# Patient Record
Sex: MALE | Race: WHITE | NOT HISPANIC OR LATINO | URBAN - METROPOLITAN AREA
[De-identification: names, ages, dates, MRNs, and addresses within clinical notes are randomized per-mention and may not be internally consistent; named-entity substitution may affect disease eponyms.]

---

## 2021-06-15 ENCOUNTER — APPOINTMENT (OUTPATIENT)
Dept: PREADMISSION TESTING | Facility: HOSPITAL | Age: 61
End: 2021-06-15
Payer: COMMERCIAL

## 2021-06-15 ENCOUNTER — APPOINTMENT (OUTPATIENT)
Dept: LAB | Age: 61
End: 2021-06-15
Attending: OPHTHALMOLOGY
Payer: COMMERCIAL

## 2021-06-15 ENCOUNTER — TRANSCRIBE ORDERS (OUTPATIENT)
Dept: SCHEDULING | Age: 61
End: 2021-06-15

## 2021-06-15 VITALS — HEIGHT: 69 IN | BODY MASS INDEX: 35.55 KG/M2 | WEIGHT: 240 LBS

## 2021-06-15 DIAGNOSIS — Z11.59 ENCOUNTER FOR SCREENING FOR OTHER VIRAL DISEASES: Primary | ICD-10-CM

## 2021-06-15 DIAGNOSIS — Z11.59 ENCOUNTER FOR SCREENING FOR OTHER VIRAL DISEASES: ICD-10-CM

## 2021-06-15 PROCEDURE — U0003 INFECTIOUS AGENT DETECTION BY NUCLEIC ACID (DNA OR RNA); SEVERE ACUTE RESPIRATORY SYNDROME CORONAVIRUS 2 (SARS-COV-2) (CORONAVIRUS DISEASE [COVID-19]), AMPLIFIED PROBE TECHNIQUE, MAKING USE OF HIGH THROUGHPUT TECHNOLOGIES AS DESCRIBED BY CMS-2020-01-R: HCPCS

## 2021-06-15 ASSESSMENT — PAIN SCALES - GENERAL: PAINLEVEL: 1

## 2021-06-15 NOTE — PRE-PROCEDURE INSTRUCTIONS
1. We will call you between 3 pm and 7 pm on June 16, 2021 to determine that arrival time for your procedure. If you do not hear by 6PM. Please call [phone] for arrival time.    2. Please report to Main Entrance near Parking lot A, walk into main lobby and report to the admission desk on the first floor on the day of your procedure.       3. Please follow the following fasting guidelines: Follow Dr. Feliciano's instructions on eating and drinking         Nothing to eat (no solid food) after midnight.    Unlimited clear liquids, meaning water or PLAIN black coffee WITHOUT any milk or cream, are permitted up to TWO HOURS prior to arrival at the hospital.     4. Early on the morning of the procedure please take your usual dose of the listed medications with a sip of water:Nothing to take the morning of the procedure.  No NSAIDS, Supplements, Vitamins    May take Tylenol if needed.   5. Other Instructions: You may brush your teeth the morning of the procedure. Rinse and spit, do not swallow.  Bring a list of your medications with dosages with you.  Use surgical wash as directed. Dial Antibacterial Soap the night before and the morning of the procedure.   6. If you develop a cold, cough, fever, rash, or other symptom prior to the data of the procedure, please report it to your physician immediately.   7. If you need to cancel the procedure for any reason, please contact your physician or call the unit listed above.   8. Make arrangements to have someone drive you home from the procedure. If you have not arranged for transportation home, your surgery may be cancelled.    9. You may not take public transportation unless accompanied by a responsible person.   10. You may not drive a car or operate complex or potentially dangerous machinery for 24 hours following anesthesia and/or sedation.   11. If it is medically necessary for you to have a longer stay, you will be informed as soon as the decision is made.   12. Do not  wear or bring anything of value to the hospital including jewelry of any kind, money, or wallet. Do not wear make-up or contact lenses. DO bring your glasses and a case. DO NOT BRING MEDICATIONS FROM HOME.   13. No lotion, creams, powders, or oils on skin the morning of procedure    14. Dress in comfortable clothes.   15.  If instructed, please bring a copy of your Advanced Directive (Living Will/Durable Power of ) on the day of your procedure.      Pre operative instructions given as per protocol.  Form explained by: Queenie Vivas RN     I have read and understand the above information. I have had sufficient opportunity to ask questions I might have and they have been answered to my satisfaction. I agree to comply with the Patient Responsibilities listed above and have received a copy of this form.

## 2021-06-16 ENCOUNTER — ANESTHESIA EVENT (OUTPATIENT)
Dept: SURGERY | Facility: HOSPITAL | Age: 61
Setting detail: HOSPITAL OUTPATIENT SURGERY
End: 2021-06-16
Payer: COMMERCIAL

## 2021-06-16 LAB — SARS-COV-2 RNA RESP QL NAA+PROBE: NEGATIVE

## 2021-06-16 NOTE — ANESTHESIA PREPROCEDURE EVALUATION
Relevant Problems   No relevant active problems       Anesthesia ROS/MED HX      Cardiovascular   dyslipidemia  Musculoskeletal   Arthritis     Past Medical History:   Diagnosis Date   • Arthritis     hands   • Lipid disorder        Past Surgical History:   Procedure Laterality Date   • APPENDECTOMY     • BACK SURGERY     • CHOLECYSTECTOMY         Physical Exam    Airway   Mallampati: II   TM distance: >3 FB   Neck ROM: full  Cardiovascular    Rhythm: regular   Rate: normalPulmonary    clear to auscultation  Dental - normal        Anesthesia Plan    Plan: general    Technique: general endotracheal     Lines and Monitors: PIV     Airway: video laryngoscope   ASA 2  Anesthetic plan and risks discussed with: patient  Induction:    intravenous   Comments:    Plan: 60M for R orbitotomy with mass excision

## 2021-06-17 ENCOUNTER — ANESTHESIA (OUTPATIENT)
Dept: SURGERY | Facility: HOSPITAL | Age: 61
Setting detail: HOSPITAL OUTPATIENT SURGERY
End: 2021-06-17
Payer: COMMERCIAL

## 2021-06-17 ENCOUNTER — HOSPITAL ENCOUNTER (OUTPATIENT)
Facility: HOSPITAL | Age: 61
Setting detail: HOSPITAL OUTPATIENT SURGERY
Discharge: HOME | End: 2021-06-17
Attending: OPHTHALMOLOGY | Admitting: OPHTHALMOLOGY
Payer: COMMERCIAL

## 2021-06-17 VITALS
HEIGHT: 69 IN | WEIGHT: 232 LBS | OXYGEN SATURATION: 94 % | HEART RATE: 73 BPM | DIASTOLIC BLOOD PRESSURE: 66 MMHG | SYSTOLIC BLOOD PRESSURE: 130 MMHG | RESPIRATION RATE: 20 BRPM | BODY MASS INDEX: 34.36 KG/M2 | TEMPERATURE: 97.1 F

## 2021-06-17 DIAGNOSIS — D49.89 ORBITAL TUMOR: ICD-10-CM

## 2021-06-17 PROCEDURE — 27200000 HC STERILE SUPPLY: Performed by: OPHTHALMOLOGY

## 2021-06-17 PROCEDURE — 63600000 HC DRUGS/DETAIL CODE: Performed by: NURSE ANESTHETIST, CERTIFIED REGISTERED

## 2021-06-17 PROCEDURE — 36000003 HC OR LEVEL 3 INITIAL 30MIN: Performed by: OPHTHALMOLOGY

## 2021-06-17 PROCEDURE — 63700000 HC SELF-ADMINISTRABLE DRUG: Performed by: STUDENT IN AN ORGANIZED HEALTH CARE EDUCATION/TRAINING PROGRAM

## 2021-06-17 PROCEDURE — 36000013 HC OR LEVEL 3 EA ADDL MIN: Performed by: OPHTHALMOLOGY

## 2021-06-17 PROCEDURE — 25800000 HC PHARMACY IV SOLUTIONS: Performed by: OPHTHALMOLOGY

## 2021-06-17 PROCEDURE — 25000000 HC PHARMACY GENERAL: Performed by: OPHTHALMOLOGY

## 2021-06-17 PROCEDURE — 71000011 HC PACU PHASE 1 EA ADDL MIN: Performed by: OPHTHALMOLOGY

## 2021-06-17 PROCEDURE — 71000002 HC PACU PHASE 2 INITIAL 30MIN: Performed by: OPHTHALMOLOGY

## 2021-06-17 PROCEDURE — 37000001 HC ANESTHESIA GENERAL: Performed by: OPHTHALMOLOGY

## 2021-06-17 PROCEDURE — 63600000 HC DRUGS/DETAIL CODE: Performed by: ANESTHESIOLOGY

## 2021-06-17 PROCEDURE — 25000000 HC PHARMACY GENERAL: Performed by: NURSE ANESTHETIST, CERTIFIED REGISTERED

## 2021-06-17 PROCEDURE — 71000001 HC PACU PHASE 1 INITIAL 30MIN: Performed by: OPHTHALMOLOGY

## 2021-06-17 PROCEDURE — 88305 TISSUE EXAM BY PATHOLOGIST: CPT | Performed by: OPHTHALMOLOGY

## 2021-06-17 PROCEDURE — 63700000 HC SELF-ADMINISTRABLE DRUG: Performed by: OPHTHALMOLOGY

## 2021-06-17 PROCEDURE — 0NBP0ZZ EXCISION OF RIGHT ORBIT, OPEN APPROACH: ICD-10-PCS | Performed by: OPHTHALMOLOGY

## 2021-06-17 RX ORDER — EPHEDRINE SULFATE 50 MG/ML
INJECTION, SOLUTION INTRAVENOUS AS NEEDED
Status: DISCONTINUED | OUTPATIENT
Start: 2021-06-17 | End: 2021-06-17 | Stop reason: SURG

## 2021-06-17 RX ORDER — HYDROMORPHONE HYDROCHLORIDE 1 MG/ML
0.5 INJECTION, SOLUTION INTRAMUSCULAR; INTRAVENOUS; SUBCUTANEOUS
Status: DISCONTINUED | OUTPATIENT
Start: 2021-06-17 | End: 2021-06-17 | Stop reason: HOSPADM

## 2021-06-17 RX ORDER — OXYMETAZOLINE HCL 0.05 %
SPRAY, NON-AEROSOL (ML) NASAL AS NEEDED
Status: DISCONTINUED | OUTPATIENT
Start: 2021-06-17 | End: 2021-06-17 | Stop reason: HOSPADM

## 2021-06-17 RX ORDER — IBUPROFEN 200 MG
16-32 TABLET ORAL AS NEEDED
Status: DISCONTINUED | OUTPATIENT
Start: 2021-06-17 | End: 2021-06-17 | Stop reason: HOSPADM

## 2021-06-17 RX ORDER — DEXAMETHASONE SODIUM PHOSPHATE 4 MG/ML
INJECTION, SOLUTION INTRA-ARTICULAR; INTRALESIONAL; INTRAMUSCULAR; INTRAVENOUS; SOFT TISSUE AS NEEDED
Status: DISCONTINUED | OUTPATIENT
Start: 2021-06-17 | End: 2021-06-17 | Stop reason: SURG

## 2021-06-17 RX ORDER — DEXTROSE 40 %
15-30 GEL (GRAM) ORAL AS NEEDED
Status: DISCONTINUED | OUTPATIENT
Start: 2021-06-17 | End: 2021-06-17 | Stop reason: HOSPADM

## 2021-06-17 RX ORDER — MIDAZOLAM HYDROCHLORIDE 2 MG/2ML
INJECTION, SOLUTION INTRAMUSCULAR; INTRAVENOUS AS NEEDED
Status: DISCONTINUED | OUTPATIENT
Start: 2021-06-17 | End: 2021-06-17 | Stop reason: SURG

## 2021-06-17 RX ORDER — BACITRACIN ZINC AND POLYMYXIN B SULFATE 500; 10000 [USP'U]/G; [USP'U]/G
OINTMENT OPHTHALMIC AS NEEDED
Status: DISCONTINUED | OUTPATIENT
Start: 2021-06-17 | End: 2021-06-17 | Stop reason: HOSPADM

## 2021-06-17 RX ORDER — METOCLOPRAMIDE HYDROCHLORIDE 5 MG/ML
10 INJECTION INTRAMUSCULAR; INTRAVENOUS
Status: DISCONTINUED | OUTPATIENT
Start: 2021-06-17 | End: 2021-06-17 | Stop reason: HOSPADM

## 2021-06-17 RX ORDER — ESCITALOPRAM OXALATE 10 MG/1
TABLET ORAL
COMMUNITY

## 2021-06-17 RX ORDER — DEXTROSE 50 % IN WATER (D50W) INTRAVENOUS SYRINGE
25 AS NEEDED
Status: DISCONTINUED | OUTPATIENT
Start: 2021-06-17 | End: 2021-06-17 | Stop reason: HOSPADM

## 2021-06-17 RX ORDER — NAPROXEN SODIUM 220 MG/1
TABLET, FILM COATED ORAL
COMMUNITY

## 2021-06-17 RX ORDER — LIDOCAINE HYDROCHLORIDE 10 MG/ML
INJECTION, SOLUTION INFILTRATION; PERINEURAL AS NEEDED
Status: DISCONTINUED | OUTPATIENT
Start: 2021-06-17 | End: 2021-06-17 | Stop reason: SURG

## 2021-06-17 RX ORDER — ONDANSETRON HYDROCHLORIDE 2 MG/ML
4 INJECTION, SOLUTION INTRAVENOUS
Status: DISCONTINUED | OUTPATIENT
Start: 2021-06-17 | End: 2021-06-17 | Stop reason: HOSPADM

## 2021-06-17 RX ORDER — ONDANSETRON HYDROCHLORIDE 2 MG/ML
INJECTION, SOLUTION INTRAVENOUS AS NEEDED
Status: DISCONTINUED | OUTPATIENT
Start: 2021-06-17 | End: 2021-06-17 | Stop reason: SURG

## 2021-06-17 RX ORDER — LIDOCAINE HCL/EPINEPHRINE/PF 2%-1:200K
VIAL (ML) INJECTION AS NEEDED
Status: DISCONTINUED | OUTPATIENT
Start: 2021-06-17 | End: 2021-06-17 | Stop reason: HOSPADM

## 2021-06-17 RX ORDER — HYDROCODONE BITARTRATE AND ACETAMINOPHEN 5; 325 MG/1; MG/1
1 TABLET ORAL ONCE AS NEEDED
Status: COMPLETED | OUTPATIENT
Start: 2021-06-17 | End: 2021-06-17

## 2021-06-17 RX ORDER — SODIUM CHLORIDE 9 MG/ML
INJECTION, SOLUTION INTRAVENOUS CONTINUOUS
Status: DISCONTINUED | OUTPATIENT
Start: 2021-06-17 | End: 2021-06-17 | Stop reason: HOSPADM

## 2021-06-17 RX ORDER — PROPOFOL 10 MG/ML
INJECTION, EMULSION INTRAVENOUS AS NEEDED
Status: DISCONTINUED | OUTPATIENT
Start: 2021-06-17 | End: 2021-06-17 | Stop reason: SURG

## 2021-06-17 RX ORDER — FENTANYL CITRATE 50 UG/ML
INJECTION, SOLUTION INTRAMUSCULAR; INTRAVENOUS AS NEEDED
Status: DISCONTINUED | OUTPATIENT
Start: 2021-06-17 | End: 2021-06-17 | Stop reason: SURG

## 2021-06-17 RX ORDER — FENTANYL CITRATE 50 UG/ML
50 INJECTION, SOLUTION INTRAMUSCULAR; INTRAVENOUS
Status: DISCONTINUED | OUTPATIENT
Start: 2021-06-17 | End: 2021-06-17 | Stop reason: HOSPADM

## 2021-06-17 RX ADMIN — MIDAZOLAM HYDROCHLORIDE 2 MG: 1 INJECTION, SOLUTION INTRAMUSCULAR; INTRAVENOUS at 14:34

## 2021-06-17 RX ADMIN — EPHEDRINE SULFATE 10 MG: 50 INJECTION, SOLUTION INTRAVENOUS at 15:03

## 2021-06-17 RX ADMIN — FENTANYL CITRATE 25 MCG: 50 INJECTION, SOLUTION INTRAMUSCULAR; INTRAVENOUS at 15:20

## 2021-06-17 RX ADMIN — FENTANYL CITRATE 25 MCG: 50 INJECTION, SOLUTION INTRAMUSCULAR; INTRAVENOUS at 15:30

## 2021-06-17 RX ADMIN — CEFAZOLIN 2 G: 330 INJECTION, POWDER, FOR SOLUTION INTRAMUSCULAR; INTRAVENOUS at 14:30

## 2021-06-17 RX ADMIN — FENTANYL CITRATE 50 MCG: 50 INJECTION, SOLUTION INTRAMUSCULAR; INTRAVENOUS at 17:01

## 2021-06-17 RX ADMIN — SODIUM CHLORIDE: 9 INJECTION, SOLUTION INTRAVENOUS at 12:42

## 2021-06-17 RX ADMIN — EPHEDRINE SULFATE 10 MG: 50 INJECTION, SOLUTION INTRAVENOUS at 14:56

## 2021-06-17 RX ADMIN — HYDROCODONE BITARTRATE AND ACETAMINOPHEN 1 TABLET: 5; 325 TABLET ORAL at 17:37

## 2021-06-17 RX ADMIN — FENTANYL CITRATE 50 MCG: 50 INJECTION, SOLUTION INTRAMUSCULAR; INTRAVENOUS at 16:05

## 2021-06-17 RX ADMIN — FENTANYL CITRATE 50 MCG: 50 INJECTION, SOLUTION INTRAMUSCULAR; INTRAVENOUS at 17:18

## 2021-06-17 RX ADMIN — DEXAMETHASONE SODIUM PHOSPHATE 10 MG: 4 INJECTION, SOLUTION INTRAMUSCULAR; INTRAVENOUS at 14:43

## 2021-06-17 RX ADMIN — FENTANYL CITRATE 25 MCG: 50 INJECTION, SOLUTION INTRAMUSCULAR; INTRAVENOUS at 14:52

## 2021-06-17 RX ADMIN — FENTANYL CITRATE 25 MCG: 50 INJECTION, SOLUTION INTRAMUSCULAR; INTRAVENOUS at 14:50

## 2021-06-17 RX ADMIN — EPHEDRINE SULFATE 10 MG: 50 INJECTION, SOLUTION INTRAVENOUS at 14:51

## 2021-06-17 RX ADMIN — ONDANSETRON HYDROCHLORIDE 4 MG: 2 SOLUTION INTRAMUSCULAR; INTRAVENOUS at 14:47

## 2021-06-17 RX ADMIN — LIDOCAINE HYDROCHLORIDE 5 ML: 10 INJECTION, SOLUTION INFILTRATION; PERINEURAL at 14:42

## 2021-06-17 RX ADMIN — PROPOFOL INJECTABLE EMULSION 200 MG: 10 INJECTION, EMULSION INTRAVENOUS at 14:42

## 2021-06-17 RX ADMIN — EPHEDRINE SULFATE 10 MG: 50 INJECTION, SOLUTION INTRAVENOUS at 15:06

## 2021-06-17 ASSESSMENT — PAIN DESCRIPTION - DESCRIPTORS: DESCRIPTORS: STABBING

## 2021-06-17 ASSESSMENT — PAIN - FUNCTIONAL ASSESSMENT: PAIN_FUNCTIONAL_ASSESSMENT: 0-10

## 2021-06-17 NOTE — H&P
Patient H&P reviewed from PCP there are no relevant changes to the patient's health.  He has a right orbital tumor for planned excision.    Earlene Feliciano MD

## 2021-06-17 NOTE — DISCHARGE INSTRUCTIONS
WILLIAM ANTUNEZ M.D.                                   SPECIALIZING IN OCULOPLASTICS                             EYELID, LACRIMAL AND ORBITAL SURGERY     POST-OPERATIVE INSTRUCTIONS FOR EYELID SURGERY:    1. Avoid strenous exercise for one week.  No heavy lifting over 10 pounds.  2. Apply ice compresses to operated eye(s) at least four times a day for 3 days, then start warm compresses four times a day.   3.  If the surgeon has placed a bandage over the surgical site, please keep in place and keep dry until instructed to remove in 2 days. If the surgeon has not placed a bandage over your surgical site, you may get your incisions wet.  You may shower but keep your back to the spray.  4. If severe bleeding, pain or loss of vision occurs call Dr. Feliciano immediately at [phone].    MEDICATIONS:    1. Apply Erythromycin ointment twice a day to suture line(s) and into the eye(s) at bedtime.   2. Use eyewash in the morning to remove the residual ointment if needed.   3. Take pain medication as instructed.   4. Use artificial tears four times a day as needed for dry eyes.   5. No  aspirin for 3 days post operatively.   6. If prescribed a medrol dose pack, use as directed. You may start tomorrow.    ADDITIONAL INSTRUCTIONS:    1.  Keep head in position higher than your heart, especially while sleeping.  2.  Due to the effects of anesthesia, you may not drive a vehicle or drink alcohol for the next 24 hours.  Plan to rest for the remainder of the day.  Your coordination and judgment will not be at their best to keep this in mind when making important decisions or caring for young children for the initial 24 hours after surgery.  3.  You may experience temporary blurring of vision, swelling, and/or bruising.  Eyelid should not swell completely shut.  If both eyes were done, these things may be more pronounced on one side.  Symmetry will be assessed at your postop exams.  4. A  follow-up appointment in 1-2 week(s) can be made at the time of surgery or can be scheduled by calling the office at [phone] with Dr. Feliciano .     FOR ANY QUESTIONS OR CONCERNS, PLEASE CALL:                       [phone]                      OR                    [phone]      [facility]                                                                                                      ARNEL    [address] [place] [address]  [id number]                                                                         [address]  [place] [address]                                                DAKOTA BENITEZ [id number]

## 2021-06-17 NOTE — OP NOTE
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  He was informed of her diagnosis, proposed treatment, feasible alternatives, prognosis with and without surgery, and the likelihood of surgical success.  The planned surgical procedure was discussed in detail with the patient in terms that could be understood including the expected results and possible complications.  The patient expressed understanding of the specific risks of surgery including, but not limited to postoperative infection, hemorrhage, poor wound healing, scarring, pain, asymmetry, loss of vision (blindness), and failure to achieve intended goals of surgery.  The possibility of further surgery being required was discussed in detail with the patient in terms that could be understood.  The patient expressed understanding and desired to proceed.        DESCRIPTION OF PROCEDURE:  The patient was brought back into the operating suite and identified by the operating surgeon, Earlene Feliciano M.D.  The  patient was placed supine on the operating table and connected to appropriate monitors.  A time-out was taken prior to starting the procedure to perform final review of the correct patient, procedure to be performed, and the operative site.  The patient was then induced under general anesthesia by the Anesthesia Team, and the right upper eyelid were marked in the area of the eyelid crease for intended lid crease approach for the orbitotomy.  The upper eyelid was then infiltrated with the aforementioned local anesthetic, and the area was prepped and draped in the usual sterile manner for surgery.  Attention was turned to the right side, where the lid markings were incised using a #15 Bard-Ashwin blade ,and the skin was excised using monopolar cautery.  The inferior aspect of the incision was distracted inferiorly using a four prong rake. And dissection was carried down to the pre-aponeurotic fat.  The orbital lobe of the lacrimal gland was identified and carefully avoided.  The levator muscle was similarly identified and carefully avoided. The dissection was then carried posteriorly above the pre-aponeurotic fat into the orbit and malleable retractors were used in a hand over hand technique and the mass was identified.  A 4-0 silk suture was passed through its anterior portion and it was pulled anteriorly with the traction suture, a combination of David's scissors and cotton tip applicators were used to bluntly separate the mass from the orbital contents.  The mass was delivered and passed off for histopathologic assessment.  The orbit was packed with an Afrin soaked cottonoid and gentle pressure applied.  The cottonoid was removed and the orbit was checked for hemostasis and noted to be clean and dry.  The lid incision was then closed with 6-0 plain gut in a running and interrupted fashion.  Meticulous hemostasis was achieved with monopolar cautery.  The patient's care was turned over to the anesthesia team and he  was brought to the recovery room in stable condition after being awoken from general anesthesia.  There were no complications in this case. He was checked in the post-operative area vision was at least 20/50 in the right eye, the pupil was round and brisk without afferent defect, orbit soft and motility full.     Earlene Feliciano MD  Oculoplastic and orbital Surgery           Condition: Stable

## 2021-06-17 NOTE — ANESTHESIA POSTPROCEDURE EVALUATION
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])    Surgeons: Earlene Feliciano MD Responsible Provider: Lev Tang DO    Anesthesia Type: general ASA Status: 2          Anesthesia Type: general  PACU Vitals  6/17/2021 1545 - 6/17/2021 1645      6/17/2021  1549 6/17/2021  1600 6/17/2021  1630 6/17/2021  1645    BP:  136/73  (!) 150/73  140/67  (!) 144/72    Temp:  --  36.7 °C (98 °F)  --  --    Pulse:  89  82  74  67    Resp:  (!) 25  20  18  20    SpO2:  97 %  98 %  97 %  97 %            Anesthesia Post Evaluation    Pain management: adequate  Patient participation: complete - patient participated  Level of consciousness: awake and alert  Cardiovascular status: acceptable  Airway Patency: adequate  Respiratory status: acceptable  Hydration status: acceptable  Anesthetic complications: no

## 2021-06-17 NOTE — OR SURGEON
Pre-Procedure patient identification:  I am the primary operating surgeon/proceduralist and I have identified the patient on 06/17/21 at 12:12 PM Earlene Feliciano MD  Phone Number: [phone]

## 2021-06-17 NOTE — BRIEF OP NOTE
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

## 2021-06-17 NOTE — ANESTHESIOLOGIST PRE-PROCEDURE ATTESTATION
Pre-Procedure Patient Identification:  I am the Primary Anesthesiologist and have identified the patient on 06/17/21 at 1:08 PM.   I have confirmed the following procedure(s) Orbiotomy With Excision Of Mass Right (R) will be performed by the following surgeon/proceduralist Earlene Feliciano MD.

## 2021-06-21 LAB
CASE RPRT: NORMAL
CLINICAL INFO: NORMAL
PATH REPORT.FINAL DX SPEC: NORMAL
PATH REPORT.GROSS SPEC: NORMAL

## 2024-04-04 ENCOUNTER — HOSPITAL ENCOUNTER (INPATIENT)
Facility: HOSPITAL | Age: 64
LOS: 6 days | Discharge: HOME | DRG: 871 | End: 2024-04-10
Attending: EMERGENCY MEDICINE | Admitting: STUDENT IN AN ORGANIZED HEALTH CARE EDUCATION/TRAINING PROGRAM
Payer: COMMERCIAL

## 2024-04-04 ENCOUNTER — APPOINTMENT (EMERGENCY)
Dept: RADIOLOGY | Facility: HOSPITAL | Age: 64
DRG: 871 | End: 2024-04-04
Payer: COMMERCIAL

## 2024-04-04 DIAGNOSIS — R07.89 CHEST WALL PAIN: ICD-10-CM

## 2024-04-04 DIAGNOSIS — R07.9 CHEST PAIN: ICD-10-CM

## 2024-04-04 DIAGNOSIS — R09.02 HYPOXIA: ICD-10-CM

## 2024-04-04 DIAGNOSIS — J18.9 PNEUMONIA: Primary | ICD-10-CM

## 2024-04-04 DIAGNOSIS — R50.9 FEVER: ICD-10-CM

## 2024-04-04 DIAGNOSIS — R41.82 ALTERED MENTAL STATUS: ICD-10-CM

## 2024-04-04 DIAGNOSIS — G89.29 OTHER CHRONIC PAIN: ICD-10-CM

## 2024-04-04 DIAGNOSIS — F41.9 ANXIOUS MOOD: ICD-10-CM

## 2024-04-04 DIAGNOSIS — F19.10 POLYSUBSTANCE ABUSE (HCC): ICD-10-CM

## 2024-04-04 DIAGNOSIS — R06.02 SOB (SHORTNESS OF BREATH): ICD-10-CM

## 2024-04-04 PROBLEM — R56.9 SEIZURES (HCC): Status: ACTIVE | Noted: 2024-04-04

## 2024-04-04 PROBLEM — A41.9 SEPSIS (HCC): Status: ACTIVE | Noted: 2024-04-04

## 2024-04-04 PROBLEM — E78.5 HYPERLIPIDEMIA: Status: ACTIVE | Noted: 2024-04-04

## 2024-04-04 LAB
2HR DELTA HS TROPONIN: 32 NG/L
4HR DELTA HS TROPONIN: 38 NG/L
ALBUMIN SERPL BCP-MCNC: 3.9 G/DL (ref 3.5–5)
ALP SERPL-CCNC: 95 U/L (ref 34–104)
ALT SERPL W P-5'-P-CCNC: 39 U/L (ref 7–52)
AMPHETAMINES SERPL QL SCN: NEGATIVE
ANION GAP SERPL CALCULATED.3IONS-SCNC: 7 MMOL/L (ref 4–13)
APTT PPP: 34 SECONDS (ref 23–37)
AST SERPL W P-5'-P-CCNC: 42 U/L (ref 13–39)
ATRIAL RATE: 107 BPM
BACTERIA UR QL AUTO: ABNORMAL /HPF
BARBITURATES UR QL: NEGATIVE
BASOPHILS # BLD AUTO: 0.01 THOUSANDS/ÂΜL (ref 0–0.1)
BASOPHILS NFR BLD AUTO: 0 % (ref 0–1)
BENZODIAZ UR QL: POSITIVE
BILIRUB SERPL-MCNC: 1.1 MG/DL (ref 0.2–1)
BILIRUB UR QL STRIP: NEGATIVE
BUN SERPL-MCNC: 26 MG/DL (ref 5–25)
CALCIUM SERPL-MCNC: 8.5 MG/DL (ref 8.4–10.2)
CARDIAC TROPONIN I PNL SERPL HS: 14 NG/L
CARDIAC TROPONIN I PNL SERPL HS: 46 NG/L
CARDIAC TROPONIN I PNL SERPL HS: 52 NG/L
CHLORIDE SERPL-SCNC: 107 MMOL/L (ref 96–108)
CLARITY UR: CLEAR
CO2 SERPL-SCNC: 28 MMOL/L (ref 21–32)
COCAINE UR QL: NEGATIVE
COLOR UR: YELLOW
CREAT SERPL-MCNC: 0.85 MG/DL (ref 0.6–1.3)
EOSINOPHIL # BLD AUTO: 0.28 THOUSAND/ÂΜL (ref 0–0.61)
EOSINOPHIL NFR BLD AUTO: 8 % (ref 0–6)
ERYTHROCYTE [DISTWIDTH] IN BLOOD BY AUTOMATED COUNT: 12.7 % (ref 11.6–15.1)
FENTANYL UR QL SCN: NEGATIVE
FLUAV RNA RESP QL NAA+PROBE: NEGATIVE
FLUBV RNA RESP QL NAA+PROBE: NEGATIVE
GFR SERPL CREATININE-BSD FRML MDRD: 92 ML/MIN/1.73SQ M
GLUCOSE SERPL-MCNC: 110 MG/DL (ref 65–140)
GLUCOSE UR STRIP-MCNC: NEGATIVE MG/DL
HCT VFR BLD AUTO: 39.7 % (ref 36.5–49.3)
HGB BLD-MCNC: 13.5 G/DL (ref 12–17)
HGB UR QL STRIP.AUTO: NEGATIVE
HYALINE CASTS #/AREA URNS LPF: ABNORMAL /LPF
HYDROCODONE UR QL SCN: NEGATIVE
IMM GRANULOCYTES # BLD AUTO: 0 THOUSAND/UL (ref 0–0.2)
IMM GRANULOCYTES NFR BLD AUTO: 0 % (ref 0–2)
INR PPP: 0.96 (ref 0.84–1.19)
KETONES UR STRIP-MCNC: NEGATIVE MG/DL
L PNEUMO1 AG UR QL IA.RAPID: NEGATIVE
LACTATE SERPL-SCNC: 1.2 MMOL/L (ref 0.5–2)
LEUKOCYTE ESTERASE UR QL STRIP: NEGATIVE
LIPASE SERPL-CCNC: 14 U/L (ref 11–82)
LYMPHOCYTES # BLD AUTO: 1.13 THOUSANDS/ÂΜL (ref 0.6–4.47)
LYMPHOCYTES NFR BLD AUTO: 33 % (ref 14–44)
MCH RBC QN AUTO: 31.5 PG (ref 26.8–34.3)
MCHC RBC AUTO-ENTMCNC: 34 G/DL (ref 31.4–37.4)
MCV RBC AUTO: 93 FL (ref 82–98)
METHADONE UR QL: NEGATIVE
MONOCYTES # BLD AUTO: 0.39 THOUSAND/ÂΜL (ref 0.17–1.22)
MONOCYTES NFR BLD AUTO: 11 % (ref 4–12)
MUCOUS THREADS UR QL AUTO: ABNORMAL
NEUTROPHILS # BLD AUTO: 1.65 THOUSANDS/ÂΜL (ref 1.85–7.62)
NEUTS SEG NFR BLD AUTO: 48 % (ref 43–75)
NITRITE UR QL STRIP: NEGATIVE
NON-SQ EPI CELLS URNS QL MICRO: ABNORMAL /HPF
NRBC BLD AUTO-RTO: 0 /100 WBCS
OPIATES UR QL SCN: NEGATIVE
OXYCODONE+OXYMORPHONE UR QL SCN: NEGATIVE
P AXIS: 41 DEGREES
PCP UR QL: NEGATIVE
PH UR STRIP.AUTO: 5.5 [PH]
PLATELET # BLD AUTO: 142 THOUSANDS/UL (ref 149–390)
PMV BLD AUTO: 9.6 FL (ref 8.9–12.7)
POTASSIUM SERPL-SCNC: 4 MMOL/L (ref 3.5–5.3)
PR INTERVAL: 182 MS
PROCALCITONIN SERPL-MCNC: 0.14 NG/ML
PROT SERPL-MCNC: 6.4 G/DL (ref 6.4–8.4)
PROT UR STRIP-MCNC: ABNORMAL MG/DL
PROTHROMBIN TIME: 13.4 SECONDS (ref 11.6–14.5)
QRS AXIS: -16 DEGREES
QRSD INTERVAL: 94 MS
QT INTERVAL: 334 MS
QTC INTERVAL: 445 MS
RBC # BLD AUTO: 4.28 MILLION/UL (ref 3.88–5.62)
RBC #/AREA URNS AUTO: ABNORMAL /HPF
RSV RNA RESP QL NAA+PROBE: NEGATIVE
S PNEUM AG UR QL: NEGATIVE
SARS-COV-2 RNA RESP QL NAA+PROBE: NEGATIVE
SODIUM SERPL-SCNC: 142 MMOL/L (ref 135–147)
SP GR UR STRIP.AUTO: 1.04 (ref 1–1.03)
T WAVE AXIS: 38 DEGREES
THC UR QL: NEGATIVE
UROBILINOGEN UR STRIP-ACNC: 3 MG/DL
VENTRICULAR RATE: 107 BPM
WBC # BLD AUTO: 3.46 THOUSAND/UL (ref 4.31–10.16)
WBC #/AREA URNS AUTO: ABNORMAL /HPF

## 2024-04-04 PROCEDURE — 80307 DRUG TEST PRSMV CHEM ANLYZR: CPT | Performed by: STUDENT IN AN ORGANIZED HEALTH CARE EDUCATION/TRAINING PROGRAM

## 2024-04-04 PROCEDURE — 96365 THER/PROPH/DIAG IV INF INIT: CPT

## 2024-04-04 PROCEDURE — 83605 ASSAY OF LACTIC ACID: CPT | Performed by: EMERGENCY MEDICINE

## 2024-04-04 PROCEDURE — 80053 COMPREHEN METABOLIC PANEL: CPT | Performed by: EMERGENCY MEDICINE

## 2024-04-04 PROCEDURE — 83690 ASSAY OF LIPASE: CPT | Performed by: EMERGENCY MEDICINE

## 2024-04-04 PROCEDURE — 99285 EMERGENCY DEPT VISIT HI MDM: CPT

## 2024-04-04 PROCEDURE — 85025 COMPLETE CBC W/AUTO DIFF WBC: CPT | Performed by: EMERGENCY MEDICINE

## 2024-04-04 PROCEDURE — 0241U HB NFCT DS VIR RESP RNA 4 TRGT: CPT | Performed by: EMERGENCY MEDICINE

## 2024-04-04 PROCEDURE — 36415 COLL VENOUS BLD VENIPUNCTURE: CPT | Performed by: EMERGENCY MEDICINE

## 2024-04-04 PROCEDURE — 71045 X-RAY EXAM CHEST 1 VIEW: CPT

## 2024-04-04 PROCEDURE — 84484 ASSAY OF TROPONIN QUANT: CPT | Performed by: EMERGENCY MEDICINE

## 2024-04-04 PROCEDURE — 85730 THROMBOPLASTIN TIME PARTIAL: CPT | Performed by: EMERGENCY MEDICINE

## 2024-04-04 PROCEDURE — 96374 THER/PROPH/DIAG INJ IV PUSH: CPT

## 2024-04-04 PROCEDURE — 94760 N-INVAS EAR/PLS OXIMETRY 1: CPT

## 2024-04-04 PROCEDURE — 85610 PROTHROMBIN TIME: CPT | Performed by: EMERGENCY MEDICINE

## 2024-04-04 PROCEDURE — 84145 PROCALCITONIN (PCT): CPT | Performed by: EMERGENCY MEDICINE

## 2024-04-04 PROCEDURE — 94660 CPAP INITIATION&MGMT: CPT

## 2024-04-04 PROCEDURE — 87449 NOS EACH ORGANISM AG IA: CPT | Performed by: STUDENT IN AN ORGANIZED HEALTH CARE EDUCATION/TRAINING PROGRAM

## 2024-04-04 PROCEDURE — 96375 TX/PRO/DX INJ NEW DRUG ADDON: CPT

## 2024-04-04 PROCEDURE — 93010 ELECTROCARDIOGRAM REPORT: CPT | Performed by: INTERNAL MEDICINE

## 2024-04-04 PROCEDURE — 81001 URINALYSIS AUTO W/SCOPE: CPT | Performed by: EMERGENCY MEDICINE

## 2024-04-04 PROCEDURE — 99285 EMERGENCY DEPT VISIT HI MDM: CPT | Performed by: EMERGENCY MEDICINE

## 2024-04-04 PROCEDURE — 93005 ELECTROCARDIOGRAM TRACING: CPT

## 2024-04-04 PROCEDURE — 87040 BLOOD CULTURE FOR BACTERIA: CPT | Performed by: EMERGENCY MEDICINE

## 2024-04-04 PROCEDURE — 99223 1ST HOSP IP/OBS HIGH 75: CPT | Performed by: STUDENT IN AN ORGANIZED HEALTH CARE EDUCATION/TRAINING PROGRAM

## 2024-04-04 RX ORDER — POLYETHYLENE GLYCOL 3350 17 G/17G
17 POWDER, FOR SOLUTION ORAL DAILY
Status: DISCONTINUED | OUTPATIENT
Start: 2024-04-04 | End: 2024-04-10 | Stop reason: HOSPADM

## 2024-04-04 RX ORDER — SODIUM CHLORIDE, SODIUM LACTATE, POTASSIUM CHLORIDE, CALCIUM CHLORIDE 600; 310; 30; 20 MG/100ML; MG/100ML; MG/100ML; MG/100ML
150 INJECTION, SOLUTION INTRAVENOUS CONTINUOUS
Status: DISCONTINUED | OUTPATIENT
Start: 2024-04-04 | End: 2024-04-05

## 2024-04-04 RX ORDER — BUPRENORPHINE 2 MG/1
4 TABLET SUBLINGUAL 4 TIMES DAILY
Status: DISCONTINUED | OUTPATIENT
Start: 2024-04-04 | End: 2024-04-10 | Stop reason: HOSPADM

## 2024-04-04 RX ORDER — CHLORDIAZEPOXIDE HYDROCHLORIDE 10 MG/1
10 CAPSULE, GELATIN COATED ORAL EVERY 8 HOURS SCHEDULED
Status: DISCONTINUED | OUTPATIENT
Start: 2024-04-04 | End: 2024-04-08

## 2024-04-04 RX ORDER — KETOROLAC TROMETHAMINE 30 MG/ML
15 INJECTION, SOLUTION INTRAMUSCULAR; INTRAVENOUS EVERY 6 HOURS PRN
Status: DISCONTINUED | OUTPATIENT
Start: 2024-04-04 | End: 2024-04-06

## 2024-04-04 RX ORDER — BUPRENORPHINE 2 MG/1
4 TABLET SUBLINGUAL 4 TIMES DAILY
COMMUNITY

## 2024-04-04 RX ORDER — ACETAMINOPHEN 325 MG/1
975 TABLET ORAL ONCE
Status: COMPLETED | OUTPATIENT
Start: 2024-04-04 | End: 2024-04-04

## 2024-04-04 RX ORDER — ACETAMINOPHEN 325 MG/1
650 TABLET ORAL EVERY 6 HOURS PRN
Status: DISCONTINUED | OUTPATIENT
Start: 2024-04-04 | End: 2024-04-05

## 2024-04-04 RX ORDER — DULOXETIN HYDROCHLORIDE 60 MG/1
60 CAPSULE, DELAYED RELEASE ORAL DAILY
COMMUNITY

## 2024-04-04 RX ORDER — PREGABALIN 25 MG/1
100 CAPSULE ORAL 2 TIMES DAILY
COMMUNITY

## 2024-04-04 RX ORDER — LEVETIRACETAM 500 MG/1
500 TABLET ORAL EVERY 12 HOURS SCHEDULED
COMMUNITY

## 2024-04-04 RX ORDER — ENOXAPARIN SODIUM 100 MG/ML
40 INJECTION SUBCUTANEOUS DAILY
Status: DISCONTINUED | OUTPATIENT
Start: 2024-04-04 | End: 2024-04-10 | Stop reason: HOSPADM

## 2024-04-04 RX ORDER — PREGABALIN 100 MG/1
100 CAPSULE ORAL 2 TIMES DAILY
Status: DISCONTINUED | OUTPATIENT
Start: 2024-04-04 | End: 2024-04-10 | Stop reason: HOSPADM

## 2024-04-04 RX ORDER — LEVETIRACETAM 500 MG/1
500 TABLET ORAL EVERY 12 HOURS SCHEDULED
Status: DISCONTINUED | OUTPATIENT
Start: 2024-04-04 | End: 2024-04-10 | Stop reason: HOSPADM

## 2024-04-04 RX ORDER — DULOXETIN HYDROCHLORIDE 60 MG/1
60 CAPSULE, DELAYED RELEASE ORAL DAILY
Status: DISCONTINUED | OUTPATIENT
Start: 2024-04-05 | End: 2024-04-07

## 2024-04-04 RX ORDER — KETOROLAC TROMETHAMINE 30 MG/ML
15 INJECTION, SOLUTION INTRAMUSCULAR; INTRAVENOUS ONCE
Status: COMPLETED | OUTPATIENT
Start: 2024-04-04 | End: 2024-04-04

## 2024-04-04 RX ADMIN — ACETAMINOPHEN 650 MG: 325 TABLET, FILM COATED ORAL at 12:17

## 2024-04-04 RX ADMIN — PREGABALIN 100 MG: 100 CAPSULE ORAL at 22:02

## 2024-04-04 RX ADMIN — KETOROLAC TROMETHAMINE 15 MG: 30 INJECTION, SOLUTION INTRAMUSCULAR; INTRAVENOUS at 08:07

## 2024-04-04 RX ADMIN — LEVETIRACETAM 500 MG: 500 TABLET, FILM COATED ORAL at 12:17

## 2024-04-04 RX ADMIN — SODIUM CHLORIDE, SODIUM LACTATE, POTASSIUM CHLORIDE, AND CALCIUM CHLORIDE 150 ML/HR: .6; .31; .03; .02 INJECTION, SOLUTION INTRAVENOUS at 10:08

## 2024-04-04 RX ADMIN — KETOROLAC TROMETHAMINE 15 MG: 30 INJECTION, SOLUTION INTRAMUSCULAR; INTRAVENOUS at 10:07

## 2024-04-04 RX ADMIN — BUPRENORPHINE 4 MG: 2 TABLET SUBLINGUAL at 19:38

## 2024-04-04 RX ADMIN — ENOXAPARIN SODIUM 40 MG: 40 INJECTION SUBCUTANEOUS at 22:02

## 2024-04-04 RX ADMIN — POLYETHYLENE GLYCOL 3350 17 G: 17 POWDER, FOR SOLUTION ORAL at 10:08

## 2024-04-04 RX ADMIN — SODIUM CHLORIDE 1000 ML: 0.9 INJECTION, SOLUTION INTRAVENOUS at 08:11

## 2024-04-04 RX ADMIN — CHLORDIAZEPOXIDE HYDROCHLORIDE 10 MG: 10 CAPSULE ORAL at 09:18

## 2024-04-04 RX ADMIN — KETOROLAC TROMETHAMINE 15 MG: 30 INJECTION, SOLUTION INTRAMUSCULAR; INTRAVENOUS at 22:02

## 2024-04-04 RX ADMIN — AZITHROMYCIN MONOHYDRATE 500 MG: 500 INJECTION, POWDER, LYOPHILIZED, FOR SOLUTION INTRAVENOUS at 08:14

## 2024-04-04 RX ADMIN — ACETAMINOPHEN 975 MG: 325 TABLET, FILM COATED ORAL at 08:03

## 2024-04-04 RX ADMIN — CEFTRIAXONE SODIUM 1000 MG: 10 INJECTION, POWDER, FOR SOLUTION INTRAVENOUS at 08:19

## 2024-04-04 RX ADMIN — LEVETIRACETAM 500 MG: 500 TABLET, FILM COATED ORAL at 22:02

## 2024-04-04 RX ADMIN — BUPRENORPHINE 4 MG: 2 TABLET SUBLINGUAL at 09:18

## 2024-04-04 NOTE — ED NOTES
Provided pt with warm compress to help alleviate pain. Plan of care ongoing.     Kesha Harvey RN  04/04/24 6661

## 2024-04-04 NOTE — SEPSIS NOTE
Sepsis Note   Lalito Cramer 63 y.o. male MRN: [id number]  Unit/Bed#: ED 15 Encounter: [id number]       Initial Sepsis Screening       Row Name 04/04/24 0921                Is the patient's history suggestive of a new or worsening infection? Yes (Proceed)  -CE        Suspected source of infection pneumonia  -CE        Indicate SIRS criteria Hyperthemia > 38.3C (100.9F) OR Hypothermia <36C (96.8F);Tachycardia > 90 bpm;Leukocytosis (WBC > 49089 IJL) OR Leukopenia (WBC <4000 IJL) OR Bandemia (WBC >10% bands)  -CE        Are two or more of the above signs & symptoms of infection both present and new to the patient? Yes (Proceed)  -CE        Assess for evidence of organ dysfunction: Are any of the below criteria present within 6 hours of suspected infection and SIRS criteria that are NOT considered to be chronic conditions? --                  User Key  (r) = Recorded By, (t) = Taken By, (c) = Cosigned By      Initials Name Provider Type    CE Korey Cam MD Physician                        There is no height or weight on file to calculate BMI.  Wt Readings from Last 1 Encounters:   04/04/24 90.7 kg (200 lb)        Height is required to calculate ideal body weight.
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
## 2024-04-04 NOTE — ASSESSMENT & PLAN NOTE
Continue Lyrica and duloxetine  Added Toradol for moderate to severe pain  Avoid narcotics given history of abuse and assessment necessary.

## 2024-04-04 NOTE — ASSESSMENT & PLAN NOTE
Presenting from drug/alcohol rehab facility with SOB and hypoxia requiring 4L NC   He reported this was ongoing for the last several days but worsened today and he developed some confusion  Confusion started today   Covid/rvp negative   Started on azithro/ctx in the ED   Sending urinary antigens   Procal next AM labs   Low threshold to get CT chest if not improving

## 2024-04-04 NOTE — ASSESSMENT & PLAN NOTE
Presenting with concerns of PNA with sob and hypoxia  , febrile to 101.9F, hypoxic to low 80s on room air now requiring 4L NC, WBC 3.4  Received 1 liter NS in the ED   Started on azithro/ctx for possible PNA   Sepsis power plan  Follow up blood and urine cultures

## 2024-04-04 NOTE — UTILIZATION REVIEW
NOTIFICATION OF INPATIENT ADMISSION   AUTHORIZATION REQUEST   SERVICING FACILITY:   [place] [address]  Tax ID: [id number]  NPI: [id number] ATTENDING PROVIDER:  Attending Name and NPI#: Blake Neri Md [[id number]]  Address: [address] [place] [address]  Phone: [phone]     ADMISSION INFORMATION:  Place of Service: Inpatient [facility]  Place of Service Code: 21  Inpatient Admission Date/Time: 4/4/24  9:21 AM  Discharge Date/Time: No discharge date for patient encounter.  Admitting Diagnosis Code/Description:  No admission diagnoses are documented for this encounter.     UTILIZATION REVIEW CONTACT:  Charlene Rodas Utilization   Network Utilization Review Department  Phone: [phone]  Fax [phone]  Email: [email or web address]  Contact for approvals/pending authorizations, clinical reviews, and discharge.     PHYSICIAN ADVISORY SERVICES:  Medical Necessity Denial & Sksy-xm-Bavs Review  Phone: [phone]  Fax: [phone]  Email: [email or web address]     DISCHARGE SUPPORT TEAM:  For Patients Discharge Needs & Updates  Phone: [phone] opt. 2 Fax: [phone]  Email: [email or web address]

## 2024-04-04 NOTE — ED NOTES
Pt has failed to produce urine independently. Bladder scanner to assess if pt is retaining urine.     Kesha Harvey RN  04/04/24 2984

## 2024-04-04 NOTE — ED PROVIDER NOTES
History  Chief Complaint   Patient presents with    Chest Pain     Per EMS Pt from [facility] c/o 6/10 chest pain midsternal and travels up to the jaw. Facility gave 1.4 nitro and 1 baby aspirin. EMS gave 1 nitro and 4 baby aspirin. Rt sided pain d/t broken ribs more than 3 months ago. Pt is in opioid recovery for 11 days. Pt still reports chest pain upon arrival.     63-year-old male no significant reported past history presenting from opioid rehab presenting with shortness of breath fever.  Patient reports couple days of general fatigue and malaise.  Patient reports that she exacerbation of chronic right-sided chest pain from broken ribs a few months ago.  Reports shortness of breath.  Reports nonproductive cough.  Denies any abdominal pain nausea vomiting diarrhea.  Patient reports some mild confusion.  Denies any other complaints.  Chart reviewed.    History reviewed. No pertinent past medical history.  Family History: non-contributory  Social History          None       History reviewed. No pertinent past medical history.    History reviewed. No pertinent surgical history.    History reviewed. No pertinent family history.  I have reviewed and agree with the history as documented.    E-Cigarette/Vaping     E-Cigarette/Vaping Substances          Review of Systems   Constitutional:  Positive for fatigue. Negative for appetite change, chills, diaphoresis, fever and unexpected weight change.   HENT:  Negative for congestion and rhinorrhea.    Eyes:  Negative for photophobia and visual disturbance.   Respiratory:  Positive for cough and shortness of breath. Negative for chest tightness.    Cardiovascular:  Positive for chest pain. Negative for palpitations and leg swelling.   Gastrointestinal:  Negative for abdominal distention, abdominal pain, blood in stool, constipation, diarrhea, nausea and vomiting.   Genitourinary:  Negative for dysuria and hematuria.   Musculoskeletal:  Negative for back pain, joint swelling,  neck pain and neck stiffness.   Skin:  Negative for color change, pallor, rash and wound.   Neurological:  Positive for weakness. Negative for dizziness, syncope, light-headedness and headaches.   Psychiatric/Behavioral:  Negative for agitation.    All other systems reviewed and are negative.      Physical Exam  Physical Exam  Vitals and nursing note reviewed.   Constitutional:       General: He is not in acute distress.     Appearance: Normal appearance. He is well-developed. He is ill-appearing. He is not toxic-appearing or diaphoretic.   HENT:      Head: Normocephalic and atraumatic.      Nose: Nose normal. No congestion or rhinorrhea.      Mouth/Throat:      Mouth: Mucous membranes are moist.      Pharynx: Oropharynx is clear. No oropharyngeal exudate or posterior oropharyngeal erythema.   Eyes:      General: No scleral icterus.        Right eye: No discharge.         Left eye: No discharge.      Extraocular Movements: Extraocular movements intact.      Conjunctiva/sclera: Conjunctivae normal.      Pupils: Pupils are equal, round, and reactive to light.   Neck:      Vascular: No JVD.      Trachea: No tracheal deviation.      Comments: Supple. Normal range of motion.   Cardiovascular:      Rate and Rhythm: Regular rhythm. Tachycardia present.      Heart sounds: Normal heart sounds. No murmur heard.     No friction rub. No gallop.      Comments: Tachycardic in the 110s and regular rhythm  Pulmonary:      Effort: Pulmonary effort is normal. No respiratory distress.      Breath sounds: No stridor. Examination of the right-lower field reveals rhonchi. Examination of the left-lower field reveals rhonchi. Rhonchi present. No wheezing or rales.      Comments: Bibasilar rhonchi  Chest:      Chest wall: Tenderness present.      Comments: Right chest wall tenderness  Abdominal:      General: Bowel sounds are normal. There is no distension.      Palpations: Abdomen is soft.      Tenderness: There is no abdominal tenderness.  There is no right CVA tenderness, left CVA tenderness, guarding or rebound.      Comments: Soft, nontender, nondistended.  Normal bowel sounds throughout   Musculoskeletal:         General: No swelling, tenderness, deformity or signs of injury. Normal range of motion.      Cervical back: Normal range of motion and neck supple. No rigidity. No muscular tenderness.      Right lower leg: No edema.      Left lower leg: No edema.   Lymphadenopathy:      Cervical: No cervical adenopathy.   Skin:     General: Skin is warm and dry.      Coloration: Skin is not pale.      Findings: No erythema or rash.   Neurological:      General: No focal deficit present.      Mental Status: He is alert. Mental status is at baseline.      Sensory: No sensory deficit.      Motor: No weakness or abnormal muscle tone.      Coordination: Coordination normal.      Gait: Gait normal.      Comments: Alert.  Strength and sensation grossly intact.  Ambulatory without difficulty at baseline.    Psychiatric:         Behavior: Behavior normal.         Thought Content: Thought content normal.         Vital Signs  ED Triage Vitals   Temperature Pulse Respirations Blood Pressure SpO2   04/04/24 0718 04/04/24 0718 04/04/24 0718 04/04/24 0718 04/04/24 0718   (!) 101.9 °F (38.8 °C) (!) 113 (!) 24 140/90 (!) 87 %      Temp Source Heart Rate Source Patient Position - Orthostatic VS BP Location FiO2 (%)   04/04/24 0718 04/04/24 0718 04/04/24 0718 04/04/24 0718 --   Oral Monitor Lying Left arm       Pain Score       04/04/24 0803       8           Vitals:    04/04/24 0718 04/04/24 0830   BP: 140/90 124/69   Pulse: (!) 113 98   Patient Position - Orthostatic VS: Lying Lying         Visual Acuity      ED Medications  Medications   buprenorphine (SUBUTEX) 2 mg SL tablet 4 mg (4 mg Sublingual Given 4/4/24 0918)   chlordiazePOXIDE (LIBRIUM) capsule 10 mg (10 mg Oral Given 4/4/24 0918)   sodium chloride 0.9 % bolus 1,000 mL (1,000 mL Intravenous New Bag 4/4/24 0811)    acetaminophen (TYLENOL) tablet 975 mg (975 mg Oral Given 4/4/24 0803)   ketorolac (TORADOL) injection 15 mg (15 mg Intravenous Given 4/4/24 0807)   ceftriaxone (ROCEPHIN) 1 g/50 mL in dextrose IVPB (0 mg Intravenous Stopped 4/4/24 0830)   azithromycin (ZITHROMAX) 500 mg in sodium chloride 0.9% 250mL IVPB 500 mg (500 mg Intravenous New Bag 4/4/24 0814)       Diagnostic Studies  Results Reviewed       Procedure Component Value Units Date/Time    FLU/RSV/COVID - if FLU/RSV clinically relevant [[id number]]  (Normal) Collected: 04/04/24 0751    Lab Status: Final result Specimen: Nares from Nose Updated: 04/04/24 0838     SARS-CoV-2 Negative     INFLUENZA A PCR Negative     INFLUENZA B PCR Negative     RSV PCR Negative    Narrative:      FOR PEDIATRIC PATIENTS - copy/paste COVID Guidelines URL to browser: [email or web address]    SARS-CoV-2 assay is a Nucleic Acid Amplification assay intended for the  qualitative detection of nucleic acid from SARS-CoV-2 in nasopharyngeal  swabs. Results are for the presumptive identification of SARS-CoV-2 RNA.    Positive results are indicative of infection with SARS-CoV-2, the virus  causing COVID-19, but do not rule out bacterial infection or co-infection  with other viruses. Laboratories within the [place] and its  territories are required to report all positive results to the appropriate  public health authorities. Negative results do not preclude SARS-CoV-2  infection and should not be used as the sole basis for treatment or other  patient management decisions. Negative results must be combined with  clinical observations, patient history, and epidemiological information.  This test has not been FDA cleared or approved.    This test has been authorized by FDA under an Emergency Use Authorization  (EUA). This test is only authorized for the duration of time the  declaration that circumstances exist justifying the  authorization of the  emergency use of an in vitro diagnostic tests for detection of SARS-CoV-2  virus and/or diagnosis of COVID-19 infection under section 564(b)(1) of  the Act, 21 U.S.C. 360bbb-3(b)(1), unless the authorization is terminated  or revoked sooner. The test has been validated but independent review by FDA  and CLIA is pending.    Test performed using [employer] GeneXpert: This RT-PCR assay targets N2,  a region unique to SARS-CoV-2. A conserved region in the E-gene was chosen  for pan-Sarbecovirus detection which includes SARS-CoV-2.    According to CMS-2020-01-R, this platform meets the definition of high-throughput technology.    Procalcitonin [[id number]]  (Normal) Collected: 04/04/24 0751    Lab Status: Final result Specimen: Blood from Arm, Left Updated: 04/04/24 0830     Procalcitonin 0.14 ng/ml     HS Troponin 0hr (reflex protocol) [[id number]]  (Normal) Collected: 04/04/24 0751    Lab Status: Final result Specimen: Blood from Arm, Left Updated: 04/04/24 0825     hs TnI 0hr 14 ng/L     HS Troponin I 2hr [[id number]]     Lab Status: No result Specimen: Blood     HS Troponin I 4hr [[id number]]     Lab Status: No result Specimen: Blood     Blood culture #2 [[id number]] Collected: 04/04/24 0821    Lab Status: In process Specimen: Blood from Arm, Left Updated: 04/04/24 0823    Comprehensive metabolic panel [[id number]]  (Abnormal) Collected: 04/04/24 0751    Lab Status: Final result Specimen: Blood from Arm, Left Updated: 04/04/24 0820     Sodium 142 mmol/L      Potassium 4.0 mmol/L      Chloride 107 mmol/L      CO2 28 mmol/L      ANION GAP 7 mmol/L      BUN 26 mg/dL      Creatinine 0.85 mg/dL      Glucose 110 mg/dL      Calcium 8.5 mg/dL      AST 42 U/L      ALT 39 U/L      Alkaline Phosphatase 95 U/L      Total Protein 6.4 g/dL      Albumin 3.9 g/dL      Total Bilirubin 1.10 mg/dL      eGFR 92 ml/min/1.73sq m     Narrative:      National Kidney Disease Foundation guidelines for Chronic Kidney Disease  (CKD):     Stage 1 with normal or high GFR (GFR > 90 mL/min/1.73 square meters)    Stage 2 Mild CKD (GFR = 60-89 mL/min/1.73 square meters)    Stage 3A Moderate CKD (GFR = 45-59 mL/min/1.73 square meters)    Stage 3B Moderate CKD (GFR = 30-44 mL/min/1.73 square meters)    Stage 4 Severe CKD (GFR = 15-29 mL/min/1.73 square meters)    Stage 5 End Stage CKD (GFR <15 mL/min/1.73 square meters)  Note: GFR calculation is accurate only with a steady state creatinine    Lactic acid [[id number]]  (Normal) Collected: 04/04/24 0751    Lab Status: Final result Specimen: Blood from Arm, Left Updated: 04/04/24 0820     LACTIC ACID 1.2 mmol/L     Narrative:      Result may be elevated if tourniquet was used during collection.    Lipase [[id number]]  (Normal) Collected: 04/04/24 0751    Lab Status: Final result Specimen: Blood from Arm, Left Updated: 04/04/24 0820     Lipase 14 u/L     Blood culture #1 [[id number]] Collected: 04/04/24 0812    Lab Status: In process Specimen: Blood from Arm, Right Updated: 04/04/24 0815    Protime-INR [[id number]]  (Normal) Collected: 04/04/24 0751    Lab Status: Final result Specimen: Blood from Arm, Left Updated: 04/04/24 0813     Protime 13.4 seconds      INR 0.96    APTT [[id number]]  (Normal) Collected: 04/04/24 0751    Lab Status: Final result Specimen: Blood from Arm, Left Updated: 04/04/24 0813     PTT 34 seconds     CBC and differential [[id number]]  (Abnormal) Collected: 04/04/24 0751    Lab Status: Final result Specimen: Blood from Arm, Left Updated: 04/04/24 0802     WBC 3.46 Thousand/uL      RBC 4.28 Million/uL      Hemoglobin 13.5 g/dL      Hematocrit 39.7 %      MCV 93 fL      MCH 31.5 pg      MCHC 34.0 g/dL      RDW 12.7 %      MPV 9.6 fL      Platelets 142 Thousands/uL      nRBC 0 /100 WBCs      Neutrophils Relative 48 %      Immature Grans % 0 %      Lymphocytes Relative 33 %      Monocytes Relative 11 %      Eosinophils Relative 8 %      Basophils Relative 0 %      Neutrophils Absolute  1.65 Thousands/µL      Absolute Immature Grans 0.00 Thousand/uL      Absolute Lymphocytes 1.13 Thousands/µL      Absolute Monocytes 0.39 Thousand/µL      Eosinophils Absolute 0.28 Thousand/µL      Basophils Absolute 0.01 Thousands/µL     UA w Reflex to Microscopic w Reflex to Culture [[id number]]     Lab Status: No result Specimen: Urine                    XR chest portable   Final Result by Sekou Allen MD (04/04 0857)      No acute cardiopulmonary disease. Chronic changes.            Workstation performed: [id number]                    Procedures  Procedures         ED Course             HEART Risk Score      Flowsheet Row Most Recent Value   Heart Score Risk Calculator    History 0 Filed at: 04/04/2024 0922   ECG 1 Filed at: 04/04/2024 0922   Age 1 Filed at: 04/04/2024 0922   Risk Factors 1 Filed at: 04/04/2024 0922   Troponin 1 Filed at: 04/04/2024 0922   HEART Score 4 Filed at: 04/04/2024 0922                       Initial Sepsis Screening       Row Name 04/04/24 0921                Is the patient's history suggestive of a new or worsening infection? Yes (Proceed)  -CE        Suspected source of infection pneumonia  -CE        Indicate SIRS criteria Hyperthemia > 38.3C (100.9F) OR Hypothermia <36C (96.8F);Tachycardia > 90 bpm;Leukocytosis (WBC > 57394 IJL) OR Leukopenia (WBC <4000 IJL) OR Bandemia (WBC >10% bands)  -CE        Are two or more of the above signs & symptoms of infection both present and new to the patient? Yes (Proceed)  -CE        Assess for evidence of organ dysfunction: Are any of the below criteria present within 6 hours of suspected infection and SIRS criteria that are NOT considered to be chronic conditions? --                  User Key  (r) = Recorded By, (t) = Taken By, (c) = Cosigned By      Initials Name Provider Type    CE Korey Cam MD Physician                                  Medical Decision Making  63-year-old male no significant reported past history presenting from  opioid rehab presenting with shortness of breath fever.  Hypoxic to 80s on room air.  Improved into the 90s on a few L nasal cannula.  Constellation of symptoms with rhonchi on exam and shortness of breath in setting of recent rib fractures concerning for infectious process such as pneumonia.  Plan for infectious evaluation.  Chest x-ray.  EKG and troponin.  Symptom management with oral and IV pain and fever medication.  IV fluids.  Reassess.    EKG interpreted by me with sinus tachycardia nonspecific ST abnormality.  Labs interpreted by me notable for white count of 3 and troponin of 14.  Plan for delta.  Chest x-ray concerning for possible right-sided pneumonia.  Given IV antibiotics.  Discussed patient with physician at rehab facility Raheel [phone] who advised that the patient has been at the facility for the last 11 days and has been having intermittent confusion and began with shortness of breath yesterday and progressed to hypoxia.  Patient was chronically on Valium and up to 80 mg of oxycodone.  Patient currently taking Subutex 4 mg 4 times daily and Librium 10 mg 3 times daily as replacement therapy.  Given hypoxia and concern for infection meeting SIRS, plan for further evaluation management inpatient.  Admitted.    Amount and/or Complexity of Data Reviewed  Labs: ordered.  Radiology: ordered.    Risk  OTC drugs.  Prescription drug management.  Decision regarding hospitalization.             Disposition  Final diagnoses:   Chest pain   Chest wall pain   SOB (shortness of breath)   Fever   Hypoxia   Pneumonia     Time reflects when diagnosis was documented in both MDM as applicable and the Disposition within this note       Time User Action Codes Description Comment    4/4/2024  7:39 AM Korey Cam Add [R07.9] Chest pain     4/4/2024  7:39 AM Jesús Camn Add [R07.89] Chest wall pain     4/4/2024  7:39 AM Jesús Camn Add [R06.02] SOB (shortness of breath)     4/4/2024  7:39 AM Jesús Camn Add [R50.9]  Fever     4/4/2024  7:39 AM ErnKorey kwon Add [R09.02] Hypoxia     4/4/2024  7:39 AM ErnKorey kwon Modify [R07.9] Chest pain     4/4/2024  7:39 AM ErnJesús kwonn Modify [R09.02] Hypoxia     4/4/2024  7:56 AM ErnKorey kwon Add [J18.9] Pneumonia     4/4/2024  7:56 AM ErnKorey kwon Modify [R09.02] Hypoxia     4/4/2024  7:56 AM ErnKorey kwon Modify [J18.9] Pneumonia           ED Disposition       ED Disposition   Admit    Condition   Stable    Date/Time   Thu Apr 4, 2024 0920    Comment   Case was discussed with SALVADOR and the patient's admission status was agreed to be Admission Status: inpatient status to the service of Dr. Neri .               Follow-up Information    None         Patient's Medications    No medications on file       No discharge procedures on file.    PDMP Review       None            ED Provider  Electronically Signed by             Korey Cam MD  04/04/24 4531

## 2024-04-04 NOTE — ASSESSMENT & PLAN NOTE
History of alcohol and opioid use disorder  He had history of oxycodone abuse and valium abuse   He was at a detox facility for the past 11 days  Currently on Subutex 4 mg 4 times daily and Librium 10 mg 3 times daily  ED spoke with provider at [facility] and reordered his withdrawal medications (as above)  Continue subutex and librium   - Family and patient requesting return to drug/alchol rehab on discharge

## 2024-04-05 LAB
ALBUMIN SERPL BCP-MCNC: 3.3 G/DL (ref 3.5–5)
ALP SERPL-CCNC: 79 U/L (ref 34–104)
ALT SERPL W P-5'-P-CCNC: 31 U/L (ref 7–52)
ANION GAP SERPL CALCULATED.3IONS-SCNC: 5 MMOL/L (ref 4–13)
AST SERPL W P-5'-P-CCNC: 32 U/L (ref 13–39)
BASOPHILS # BLD AUTO: 0.02 THOUSANDS/ÂΜL (ref 0–0.1)
BASOPHILS NFR BLD AUTO: 1 % (ref 0–1)
BILIRUB SERPL-MCNC: 1.05 MG/DL (ref 0.2–1)
BUN SERPL-MCNC: 32 MG/DL (ref 5–25)
CALCIUM ALBUM COR SERPL-MCNC: 8.5 MG/DL (ref 8.3–10.1)
CALCIUM SERPL-MCNC: 7.9 MG/DL (ref 8.4–10.2)
CHLORIDE SERPL-SCNC: 108 MMOL/L (ref 96–108)
CO2 SERPL-SCNC: 29 MMOL/L (ref 21–32)
CREAT SERPL-MCNC: 0.74 MG/DL (ref 0.6–1.3)
EOSINOPHIL # BLD AUTO: 0.31 THOUSAND/ÂΜL (ref 0–0.61)
EOSINOPHIL NFR BLD AUTO: 9 % (ref 0–6)
ERYTHROCYTE [DISTWIDTH] IN BLOOD BY AUTOMATED COUNT: 13 % (ref 11.6–15.1)
GFR SERPL CREATININE-BSD FRML MDRD: 97 ML/MIN/1.73SQ M
GLUCOSE SERPL-MCNC: 87 MG/DL (ref 65–140)
HCT VFR BLD AUTO: 31.2 % (ref 36.5–49.3)
HGB BLD-MCNC: 10.6 G/DL (ref 12–17)
IMM GRANULOCYTES # BLD AUTO: 0 THOUSAND/UL (ref 0–0.2)
IMM GRANULOCYTES NFR BLD AUTO: 0 % (ref 0–2)
LYMPHOCYTES # BLD AUTO: 1.67 THOUSANDS/ÂΜL (ref 0.6–4.47)
LYMPHOCYTES NFR BLD AUTO: 45 % (ref 14–44)
MAGNESIUM SERPL-MCNC: 1.7 MG/DL (ref 1.9–2.7)
MCH RBC QN AUTO: 31.8 PG (ref 26.8–34.3)
MCHC RBC AUTO-ENTMCNC: 33.7 G/DL (ref 31.4–37.4)
MCV RBC AUTO: 95 FL (ref 82–98)
MONOCYTES # BLD AUTO: 0.57 THOUSAND/ÂΜL (ref 0.17–1.22)
MONOCYTES NFR BLD AUTO: 16 % (ref 4–12)
NEUTROPHILS # BLD AUTO: 1.05 THOUSANDS/ÂΜL (ref 1.85–7.62)
NEUTS SEG NFR BLD AUTO: 29 % (ref 43–75)
NRBC BLD AUTO-RTO: 0 /100 WBCS
PLATELET # BLD AUTO: 116 THOUSANDS/UL (ref 149–390)
PMV BLD AUTO: 10.6 FL (ref 8.9–12.7)
POTASSIUM SERPL-SCNC: 3.9 MMOL/L (ref 3.5–5.3)
PROCALCITONIN SERPL-MCNC: 0.92 NG/ML
PROT SERPL-MCNC: 5.5 G/DL (ref 6.4–8.4)
RBC # BLD AUTO: 3.3 MILLION/UL (ref 3.88–5.62)
SODIUM SERPL-SCNC: 142 MMOL/L (ref 135–147)
WBC # BLD AUTO: 3.62 THOUSAND/UL (ref 4.31–10.16)

## 2024-04-05 PROCEDURE — 99233 SBSQ HOSP IP/OBS HIGH 50: CPT | Performed by: STUDENT IN AN ORGANIZED HEALTH CARE EDUCATION/TRAINING PROGRAM

## 2024-04-05 PROCEDURE — 80053 COMPREHEN METABOLIC PANEL: CPT | Performed by: STUDENT IN AN ORGANIZED HEALTH CARE EDUCATION/TRAINING PROGRAM

## 2024-04-05 PROCEDURE — 83735 ASSAY OF MAGNESIUM: CPT | Performed by: STUDENT IN AN ORGANIZED HEALTH CARE EDUCATION/TRAINING PROGRAM

## 2024-04-05 PROCEDURE — 85025 COMPLETE CBC W/AUTO DIFF WBC: CPT | Performed by: STUDENT IN AN ORGANIZED HEALTH CARE EDUCATION/TRAINING PROGRAM

## 2024-04-05 PROCEDURE — 94660 CPAP INITIATION&MGMT: CPT

## 2024-04-05 PROCEDURE — 94760 N-INVAS EAR/PLS OXIMETRY 1: CPT

## 2024-04-05 PROCEDURE — 84145 PROCALCITONIN (PCT): CPT | Performed by: STUDENT IN AN ORGANIZED HEALTH CARE EDUCATION/TRAINING PROGRAM

## 2024-04-05 RX ORDER — LANOLIN ALCOHOL/MO/W.PET/CERES
3 CREAM (GRAM) TOPICAL
Status: DISCONTINUED | OUTPATIENT
Start: 2024-04-05 | End: 2024-04-10 | Stop reason: HOSPADM

## 2024-04-05 RX ORDER — ALBUTEROL SULFATE 2.5 MG/3ML
2.5 SOLUTION RESPIRATORY (INHALATION) EVERY 6 HOURS PRN
Status: DISCONTINUED | OUTPATIENT
Start: 2024-04-05 | End: 2024-04-10 | Stop reason: HOSPADM

## 2024-04-05 RX ORDER — MAGNESIUM SULFATE HEPTAHYDRATE 40 MG/ML
2 INJECTION, SOLUTION INTRAVENOUS ONCE
Qty: 50 ML | Refills: 0 | Status: COMPLETED | OUTPATIENT
Start: 2024-04-05 | End: 2024-04-05

## 2024-04-05 RX ORDER — ACETAMINOPHEN 325 MG/1
650 TABLET ORAL EVERY 6 HOURS
Status: DISCONTINUED | OUTPATIENT
Start: 2024-04-05 | End: 2024-04-07

## 2024-04-05 RX ORDER — LORAZEPAM 2 MG/ML
0.5 INJECTION INTRAMUSCULAR EVERY 8 HOURS PRN
Status: DISCONTINUED | OUTPATIENT
Start: 2024-04-05 | End: 2024-04-06

## 2024-04-05 RX ORDER — LORAZEPAM 2 MG/ML
1 INJECTION INTRAMUSCULAR ONCE
Status: COMPLETED | OUTPATIENT
Start: 2024-04-05 | End: 2024-04-05

## 2024-04-05 RX ADMIN — AZITHROMYCIN MONOHYDRATE 500 MG: 500 INJECTION, POWDER, LYOPHILIZED, FOR SOLUTION INTRAVENOUS at 11:04

## 2024-04-05 RX ADMIN — MAGNESIUM SULFATE HEPTAHYDRATE 2 G: 40 INJECTION, SOLUTION INTRAVENOUS at 15:39

## 2024-04-05 RX ADMIN — BUPRENORPHINE 4 MG: 2 TABLET SUBLINGUAL at 17:20

## 2024-04-05 RX ADMIN — SODIUM CHLORIDE, SODIUM LACTATE, POTASSIUM CHLORIDE, AND CALCIUM CHLORIDE 150 ML/HR: .6; .31; .03; .02 INJECTION, SOLUTION INTRAVENOUS at 01:00

## 2024-04-05 RX ADMIN — KETOROLAC TROMETHAMINE 15 MG: 30 INJECTION, SOLUTION INTRAMUSCULAR; INTRAVENOUS at 20:26

## 2024-04-05 RX ADMIN — LEVETIRACETAM 500 MG: 500 TABLET, FILM COATED ORAL at 21:26

## 2024-04-05 RX ADMIN — ENOXAPARIN SODIUM 40 MG: 40 INJECTION SUBCUTANEOUS at 21:26

## 2024-04-05 RX ADMIN — CEFTRIAXONE SODIUM 2000 MG: 2 INJECTION, POWDER, FOR SOLUTION INTRAMUSCULAR; INTRAVENOUS at 08:21

## 2024-04-05 RX ADMIN — ACETAMINOPHEN 650 MG: 325 TABLET, FILM COATED ORAL at 15:40

## 2024-04-05 RX ADMIN — LEVETIRACETAM 500 MG: 500 TABLET, FILM COATED ORAL at 08:25

## 2024-04-05 RX ADMIN — PREGABALIN 100 MG: 100 CAPSULE ORAL at 08:25

## 2024-04-05 RX ADMIN — DULOXETINE HYDROCHLORIDE 60 MG: 60 CAPSULE, DELAYED RELEASE ORAL at 08:25

## 2024-04-05 RX ADMIN — ACETAMINOPHEN 650 MG: 325 TABLET, FILM COATED ORAL at 21:26

## 2024-04-05 RX ADMIN — LORAZEPAM 1 MG: 2 INJECTION INTRAMUSCULAR; INTRAVENOUS at 09:10

## 2024-04-05 RX ADMIN — BUPRENORPHINE 4 MG: 2 TABLET SUBLINGUAL at 16:10

## 2024-04-05 RX ADMIN — BUPRENORPHINE 4 MG: 2 TABLET SUBLINGUAL at 05:19

## 2024-04-05 RX ADMIN — CHLORDIAZEPOXIDE HYDROCHLORIDE 10 MG: 10 CAPSULE ORAL at 17:20

## 2024-04-05 RX ADMIN — SODIUM CHLORIDE, SODIUM LACTATE, POTASSIUM CHLORIDE, AND CALCIUM CHLORIDE 150 ML/HR: .6; .31; .03; .02 INJECTION, SOLUTION INTRAVENOUS at 10:58

## 2024-04-05 RX ADMIN — CHLORDIAZEPOXIDE HYDROCHLORIDE 10 MG: 10 CAPSULE ORAL at 08:24

## 2024-04-05 RX ADMIN — PREGABALIN 100 MG: 100 CAPSULE ORAL at 17:20

## 2024-04-05 RX ADMIN — KETOROLAC TROMETHAMINE 15 MG: 30 INJECTION, SOLUTION INTRAMUSCULAR; INTRAVENOUS at 11:33

## 2024-04-05 RX ADMIN — BUPRENORPHINE 4 MG: 2 TABLET SUBLINGUAL at 23:51

## 2024-04-05 RX ADMIN — BUPRENORPHINE 4 MG: 2 TABLET SUBLINGUAL at 00:52

## 2024-04-05 RX ADMIN — KETOROLAC TROMETHAMINE 15 MG: 30 INJECTION, SOLUTION INTRAMUSCULAR; INTRAVENOUS at 05:19

## 2024-04-05 RX ADMIN — CHLORDIAZEPOXIDE HYDROCHLORIDE 10 MG: 10 CAPSULE ORAL at 00:52

## 2024-04-05 RX ADMIN — Medication 3 MG: at 21:50

## 2024-04-05 NOTE — ASSESSMENT & PLAN NOTE
Presenting from drug/alcohol rehab facility with SOB and hypoxia requiring 4L NC   He reported this was ongoing for the last several days but worsened today and he developed some confusion  Confusion started on the morning of admission   Covid/rvp negative   Started on azithro/ctx in the ED   Sending urinary antigens - negative   Procal - elevated   Low threshold to get CT chest if not improving

## 2024-04-05 NOTE — CASE MANAGEMENT
Case Management Assessment & Discharge Planning Note    Patient name Lalito Cramer  Location /-01 MRN [id number]  : 1960 Date 2024       Current Admission Date: 2024  Current Admission Diagnosis:Sepsis (HCC)   Patient Active Problem List    Diagnosis Date Noted    Pneumonia 2024    Sepsis (HCC) 2024    Polysubstance abuse (HCC) 2024    Seizures (HCC) 2024    Hyperlipidemia 2024    Chronic pain 2024      LOS (days): 1  Geometric Mean LOS (GMLOS) (days):   Days to GMLOS:     OBJECTIVE:    Risk of Unplanned Readmission Score: 12.27         Current admission status: Inpatient  Referral Reason: Other (D/C planning)    Preferred Pharmacy: No Pharmacies Listed  Primary Care Provider: No primary care provider on file.    Primary Insurance: [employer]  Secondary Insurance:     ASSESSMENT:  Active Health Care Proxies    There are no active Health Care Proxies on file.       Advance Directives  Does patient have a Health Care POA?: No  Was patient offered paperwork?: Yes  Does patient currently have a Health Care decision maker?: No  Does patient have Advance Directives?: No  Was patient offered paperwork?: Yes  Primary Contact: Alma Bustos         Readmission Root Cause  30 Day Readmission: No    Patient Information  Admitted from:: Facility ([facility])  Mental Status: Confused  During Assessment patient was accompanied by: Daughter, Son  Assessment information provided by:: Daughter  Primary Caregiver: Self (Currently in rehabilitation for OUD)  Support Systems: Spouse/significant other, Family members, Children  County of Residence: [place]  What city do you live in?: [place]  Home entry access options. Select all that apply.: Stairs  Number of steps to enter home.: 1  Do the steps have railings?: No  Type of Current Residence: 2 story home  Upon entering residence, is there a bedroom on the main floor (no further steps)?: No  A  bedroom is located on the following floor levels of residence (select all that apply):: 2nd Floor  Upon entering residence, is there a bathroom on the main floor (no further steps)?: Yes  Living Arrangements: Lives w/ Spouse/significant other  Is patient a ?: No    Activities of Daily Living Prior to Admission  Functional Status: Independent  Completes ADLs independently?: Yes  Ambulates independently?: No  Level of ambulatory dependence: Assistance (At baseline, pt does not use DME. Pt has been using walker at rehab due to detox symptoms.)  Does patient use assisted devices?: Yes  Assisted Devices (DME) used: Walker, Straight Cane  Does patient currently own DME?: Yes  What DME does the patient currently own?: Straight Cane, Walker  Does patient have a history of Outpatient Therapy (PT/OT)?: Yes  Does the patient have a history of Short-Term Rehab?: Yes  Does patient have a history of HHC?: Yes  Does patient currently have HHC?: No         Patient Information Continued  Income Source: Pension/long-term  Does patient have prescription coverage?: Yes  Does patient receive dialysis treatments?: No  Does patient have a history of substance abuse?: Yes  Historical substance use preference: Benzodiazepines, Oxycodone  History of Withdrawal Symptoms: Delirium tremors  Is patient currently in treatment for substance abuse?: Yes (Pt currently on [facility])  Does patient have a history of Mental Health Diagnosis?: Yes  Is patient receiving treatment for mental health?: Yes  Has patient received inpatient treatment related to mental health in the last 2 years?: No                Social Determinants of Health (SDOH)      Flowsheet Row Most Recent Value   Housing Stability    In the last 12 months, was there a time when you were not able to pay the mortgage or rent on time? N   In the last 12 months, how many places have you lived? 1   In the last 12 months, was there a time when you did not have a  steady place to sleep or slept in a shelter (including now)? N   Transportation Needs    In the past 12 months, has lack of transportation kept you from medical appointments or from getting medications? no   In the past 12 months, has lack of transportation kept you from meetings, work, or from getting things needed for daily living? No   Food Insecurity    Within the past 12 months, you worried that your food would run out before you got the money to buy more. Never true   Within the past 12 months, the food you bought just didn't last and you didn't have money to get more. Never true   Utilities    In the past 12 months has the electric, gas, oil, or water company threatened to shut off services in your home? No            DISCHARGE DETAILS:    Discharge planning discussed with:: Pt, daughter  Freedom of Choice: Yes     CM contacted family/caregiver?: Yes  Were Treatment Team discharge recommendations reviewed with patient/caregiver?: Yes  Did patient/caregiver verbalize understanding of patient care needs?: Yes       Contacts  Patient Contacts: Alma Bustos  Relationship to Patient:: Family  Contact Method: Phone  Phone Number: [phone]  Reason/Outcome: Discharge Planning, Continuity of Care    Requested Home Health Care         Is the patient interested in HHC at discharge?: No    DME Referral Provided  Referral made for DME?: No    Other Referral/Resources/Interventions Provided:  Interventions: Advanced Directives, Other (Specify) (LUIZA with [facility])  Referral Comments: CM met with pt and children at bedside. Pt's daughter req POA information. CM provided pt and family with AD and POA paperwork. Pt's daughter reported that pt is currently in [facility] due to OUD. Pt will be going back to [facility] rehab at D/C. CM contacted [facility] to confirm LUIZA. [facility] reported that if pt req PT/OT, pt will have to go to SNF prior to returning.         Treatment Team  Recommendation: Substance Abuse Treatment ([facility])  Discharge Destination Plan:: Substance Abuse Treatment  Transport at Discharge : Other (Comment) (TBD)

## 2024-04-05 NOTE — RESPIRATORY THERAPY NOTE
04/05/24 0400   Respiratory Assessment   Resp Comments Pt found off cpap   Non-Invasive Information   SpO2 96 %     RT Ventilator Management Note  Lalito Cramer 63 y.o. male MRN: [id number]  Unit/Bed#: -01 Encounter: [id number]      Daily Screen    No data found in the last 10 encounters.           Physical Exam:   Assessment Type: Assess only  General Appearance: Drowsy  Respiratory Pattern: Normal  Chest Assessment: Chest expansion symmetrical  Bilateral Breath Sounds: Diminished  Cough: None  O2 Device: V30 2lpm bleed-in      Resp Comments: Pt found off cpap

## 2024-04-05 NOTE — ASSESSMENT & PLAN NOTE
Continue keppra   History is unclear to the patient or family whether he actually has a history of seizures.  However was found to be on medication list so we will continue for now.

## 2024-04-05 NOTE — ASSESSMENT & PLAN NOTE
History of alcohol and opioid use disorder  He had history of oxycodone abuse and valium abuse   He was at a detox facility for the past 11 days  Currently on Subutex 4 mg 4 times daily and Librium 10 mg 3 times daily  ED spoke with provider at [facility] and reordered his withdrawal medications (as above)  Continue subutex and librium   - Family and patient requesting return to drug/alchol rehab on discharge   - Discussed with toxicology and they did not recommend any further short-acting benzos at this time   - Discussed with family   - continue librium and will add a PRN ativan 0.5 mg IV for agitation

## 2024-04-05 NOTE — PROGRESS NOTES
[facility]  Progress Note  Name: Lalito Cramer I  MRN: [id number]  Unit/Bed#: -01 I Date of Admission: 4/4/2024   Date of Service: 4/5/2024 I Hospital Day: 1    Assessment/Plan   * Sepsis (HCC)  Assessment & Plan  Presenting with concerns of PNA with sob and hypoxia  , febrile to 101.9F, hypoxic to low 80s on room air now requiring 4L NC, WBC 3.4  Received 1 liter NS in the ED   Started on azithro/ctx for possible PNA   Sepsis power plan  Follow up blood and urine cultures      Pneumonia  Assessment & Plan  Presenting from drug/alcohol rehab facility with SOB and hypoxia requiring 4L NC   He reported this was ongoing for the last several days but worsened today and he developed some confusion  Confusion started on the morning of admission   Covid/rvp negative   Started on azithro/ctx in the ED   Sending urinary antigens - negative   Procal - elevated   Low threshold to get CT chest if not improving       Polysubstance abuse (HCC)  Assessment & Plan  History of alcohol and opioid use disorder  He had history of oxycodone abuse and valium abuse   He was at a detox facility for the past 11 days  Currently on Subutex 4 mg 4 times daily and Librium 10 mg 3 times daily  ED spoke with provider at [place] and reordered his withdrawal medications (as above)  Continue subutex and librium   - Family and patient requesting return to drug/alchol rehab on discharge   - Discussed with toxicology and they did not recommend any further short-acting benzos at this time   - Discussed with family   - continue librium and will add a PRN ativan 0.5 mg IV for agitation     Chronic pain  Assessment & Plan  Continue Lyrica and duloxetine  Added Toradol for moderate to severe pain  Avoid narcotics given history of abuse unless absolutely necessary.    Hyperlipidemia  Assessment & Plan  Continue statin    Seizures (HCC)  Assessment & Plan  Continue keppra   History is unclear to the patient or family  whether he actually has a history of seizures.  However was found to be on medication list so we will continue for now.             VTE Pharmacologic Prophylaxis:   Moderate Risk (Score 3-4) - Pharmacological DVT Prophylaxis Ordered: enoxaparin (Lovenox).    Mobility:   Basic Mobility Inpatient Raw Score: 19  JH-HLM Goal: 6: Walk 10 steps or more  JH-HLM Achieved: 6: Walk 10 steps or more  JH-HLM Goal achieved. Continue to encourage appropriate mobility.    Patient Centered Rounds: I performed bedside rounds with nursing staff today.   Discussions with Specialists or Other Care Team Provider: none    Education and Discussions with Family / Patient: Updated  (daughter) via phone.    Total Time Spent on Date of Encounter in care of patient: 55 mins. This time was spent on one or more of the following: performing physical exam; counseling and coordination of care; obtaining or reviewing history; documenting in the medical record; reviewing/ordering tests, medications or procedures; communicating with other healthcare professionals and discussing with patient's family/caregivers.    Current Length of Stay: 1 day(s)  Current Patient Status: Inpatient   Certification Statement: The patient will continue to require additional inpatient hospital stay due to sepsis/PNA  Discharge Plan: Anticipate discharge in 24-48 hrs to TBD    Code Status: Level 1 - Full Code    Subjective:   This AM had episode of agitation requiring controlled team to be called.  He received IV Ativan 1 mg and did return to normal mental status shortly afterwards.  Discussed with his family in the room.    Objective:     Vitals:   Temp (24hrs), Av.4 °F (36.9 °C), Min:98.2 °F (36.8 °C), Max:98.8 °F (37.1 °C)    Temp:  [98.2 °F (36.8 °C)-98.8 °F (37.1 °C)] 98.3 °F (36.8 °C)  HR:  [64-67] 66  Resp:  [16-20] 20  BP: (108-138)/(60-83) 138/83  SpO2:  [95 %-97 %] 95 %  Body mass index is 29.53 kg/m².     Input and Output Summary (last 24  hours):     Intake/Output Summary (Last 24 hours) at 4/5/2024 1624  Last data filed at 4/5/2024 0900  Gross per 24 hour   Intake 280 ml   Output 100 ml   Net 180 ml       Physical Exam:   Physical Exam   NAD  Nonlabored resp  RRR  NTND abd  aaox3    Additional Data:     Labs:  Results from last 7 days   Lab Units 04/05/24  0447   WBC Thousand/uL 3.62*   HEMOGLOBIN g/dL 10.6*   HEMATOCRIT % 31.2*   PLATELETS Thousands/uL 116*   NEUTROS PCT % 29*   LYMPHS PCT % 45*   MONOS PCT % 16*   EOS PCT % 9*     Results from last 7 days   Lab Units 04/05/24  0447   SODIUM mmol/L 142   POTASSIUM mmol/L 3.9   CHLORIDE mmol/L 108   CO2 mmol/L 29   BUN mg/dL 32*   CREATININE mg/dL 0.74   ANION GAP mmol/L 5   CALCIUM mg/dL 7.9*   ALBUMIN g/dL 3.3*   TOTAL BILIRUBIN mg/dL 1.05*   ALK PHOS U/L 79   ALT U/L 31   AST U/L 32   GLUCOSE RANDOM mg/dL 87     Results from last 7 days   Lab Units 04/04/24  0751   INR  0.96             Results from last 7 days   Lab Units 04/05/24  0447 04/04/24  0751   LACTIC ACID mmol/L  --  1.2   PROCALCITONIN ng/ml 0.92* 0.14       Lines/Drains:  Invasive Devices       Peripheral Intravenous Line  Duration             Peripheral IV 04/04/24 Left Antecubital 1 day    Peripheral IV 04/04/24 Right Antecubital 1 day                          Imaging: Reviewed radiology reports from this admission including: chest xray    Recent Cultures (last 7 days):   Results from last 7 days   Lab Units 04/04/24  1515 04/04/24  0821 04/04/24  0812   BLOOD CULTURE   --  No Growth at 24 hrs. No Growth at 24 hrs.   LEGIONELLA URINARY ANTIGEN  Negative  --   --        Last 24 Hours Medication List:   Current Facility-Administered Medications   Medication Dose Route Frequency Provider Last Rate    acetaminophen  650 mg Oral Q6H Blake Neri MD      azithromycin  500 mg Intravenous Q24H Blake Neri MD      buprenorphine  4 mg Sublingual 4x Daily Korey Cam MD      cefTRIAXone  2,000 mg Intravenous Q24H Blake Neri MD 2,000 mg  (04/05/24 0821)    chlordiazePOXIDE  10 mg Oral Q8H [facility] Korey Cam MD      DULoxetine  60 mg Oral Daily Blake Neri MD      enoxaparin  40 mg Subcutaneous Daily Blake Neri MD      ketorolac  15 mg Intravenous Q6H PRN Blake Neri MD      levETIRAcetam  500 mg Oral Q12H [facility] Blake Neri MD      LORazepam  0.5 mg Intravenous Q8H PRN Blake Neri MD      magnesium sulfate  2 g Intravenous Once Blake Neri MD 2 g (04/05/24 1539)    polyethylene glycol  17 g Oral Daily Blake Neri MD      pregabalin  100 mg Oral BID Blake Neri MD          Today, Patient Was Seen By: Blake Neri MD    **Please Note: This note may have been constructed using a voice recognition system.**

## 2024-04-05 NOTE — PLAN OF CARE
Problem: Potential for Falls  Goal: Patient will remain free of falls  Description: INTERVENTIONS:  - Educate patient/family on patient safety including physical limitations  - Instruct patient to call for assistance with activity   - Consult OT/PT to assist with strengthening/mobility   - Keep Call bell within reach  - Keep bed low and locked with side rails adjusted as appropriate  - Keep care items and personal belongings within reach  - Initiate and maintain comfort rounds  - Make Fall Risk Sign visible to staff  - Offer Toileting every 2 Hours, in advance of need  - Initiate/Maintain bed alarm  - Obtain necessary fall risk management equipment: bed alarm non skid socks  - Apply yellow socks and bracelet for high fall risk patients  - Consider moving patient to room near nurses station  Outcome: Progressing     Problem: Prexisting or High Potential for Compromised Skin Integrity  Goal: Skin integrity is maintained or improved  Description: INTERVENTIONS:  - Identify patients at risk for skin breakdown  - Assess and monitor skin integrity  - Assess and monitor nutrition and hydration status  - Monitor labs   - Assess for incontinence   - Turn and reposition patient  - Assist with mobility/ambulation  - Relieve pressure over bony prominences  - Avoid friction and shearing  - Provide appropriate hygiene as needed including keeping skin clean and dry  - Evaluate need for skin moisturizer/barrier cream  - Collaborate with interdisciplinary team   - Patient/family teaching  - Consider wound care consult   Outcome: Progressing

## 2024-04-05 NOTE — CONSULTS
INTERPROFESSIONAL (PHONE) CONSULTATION - Medical Toxicology  Lalito Cramer 63 y.o. male MRN: [id number]  Unit/Bed#: -01 Encounter: [id number]      Reason for Consult / Principal Problem: Possible benzodiazepine withdrawal  Inpatient consult to Toxicology  Consult performed by: José Bueno DO  Consult ordered by: Blake Neri MD        04/05/24      ASSESSMENT:  Pneumonia  Opioid Use Disorder  Benzodiazepine Use Disorder    RECOMMENDATIONS:  The patient was placed on buprenorphine 4mg QID and was on Librium 10mg TID at [facility] and had been there for 11 days. Assuming he was not taking additional benzodiazepines there, development of benzodiazepien withdrawal at this point is not a concern. His current regimen can be maintained without a need for supplemental AMERICO agonists. If he is going to be here for an extended time, Librium can be decreased to 10mg BID for 2 days followed by 10mg daily for 2 days. Otherwise, his TID regimen can be maintained and [facility] can resume weaning per their protocols after discharged.      For further questions, please contact the medical  on call via [place] [facility] between 8am and 9pm. If between 9pm and 8am, please reach out to the [facility] at [phone].     Please see additional teaching note below:    Hx and PE limited by the dynamics of a phone consultation. I have not personally interviewed or evaluated the patient, but only advised based on the information provided to me. Primary provider is responsible for all clinical decisions.     Pertinent history, physical exam and clinical findings and course discussed: Lalito Cramer is a 63 y.o. year old male who presents with SOB and hypoxia. He was at [facility] for OUD and Benzo Use Disorder. He was there for 11 days. Over the past few days, he developed worsening SOB. Was sent to the ED and ultimately admitted for treatment of pneumonia with hypoxia. He had agitation this AM and was given 1mg  lorazepam. There was concern for possible benzo withdrawal.     Review of systems and physical exam not performed by me.    Historical Information   History reviewed. No pertinent past medical history.  History reviewed. No pertinent surgical history.  Social History   Social History     Substance and Sexual Activity   Alcohol Use Not Currently     Social History     Substance and Sexual Activity   Drug Use Not on file     Social History     Tobacco Use   Smoking Status Never   Smokeless Tobacco Never     History reviewed. No pertinent family history.     Prior to Admission medications    Medication Sig Start Date End Date Taking? Authorizing Provider   buprenorphine (SUBUTEX) 2 mg Place 4 mg under the tongue 4 (four) times a day   Yes Historical Provider, MD   DULoxetine (CYMBALTA) 60 mg delayed release capsule Take 60 mg by mouth daily   Yes Historical Provider, MD   levETIRAcetam (KEPPRA) 500 mg tablet Take 500 mg by mouth every 12 (twelve) hours   Yes Historical Provider, MD   pregabalin (LYRICA) 25 mg capsule Take 100 mg by mouth 2 (two) times a day   Yes Historical Provider, MD       Current Facility-Administered Medications   Medication Dose Route Frequency    acetaminophen (TYLENOL) tablet 650 mg  650 mg Oral Q6H PRN    azithromycin (ZITHROMAX) 500 mg in sodium chloride 0.9% 250mL IVPB 500 mg  500 mg Intravenous Q24H    buprenorphine (SUBUTEX) 2 mg SL tablet 4 mg  4 mg Sublingual 4x Daily    ceftriaxone (ROCEPHIN) 2 g/50 mL in dextrose IVPB  2,000 mg Intravenous Q24H    chlordiazePOXIDE (LIBRIUM) capsule 10 mg  10 mg Oral Q8H YARIEL    DULoxetine (CYMBALTA) delayed release capsule 60 mg  60 mg Oral Daily    enoxaparin (LOVENOX) subcutaneous injection 40 mg  40 mg Subcutaneous Daily    ketorolac (TORADOL) injection 15 mg  15 mg Intravenous Q6H PRN    lactated ringers infusion  150 mL/hr Intravenous Continuous    levETIRAcetam (KEPPRA) tablet 500 mg  500 mg Oral Q12H YARIEL    magnesium sulfate 2 g/50 mL IVPB  (premix) 2 g  2 g Intravenous Once    polyethylene glycol (MIRALAX) packet 17 g  17 g Oral Daily    pregabalin (LYRICA) capsule 100 mg  100 mg Oral BID       No Known Allergies    Objective       Intake/Output Summary (Last 24 hours) at 4/5/2024 1302  Last data filed at 4/5/2024 0110  Gross per 24 hour   Intake 100 ml   Output 100 ml   Net 0 ml       Invasive Devices:   Peripheral IV 04/04/24 Left Antecubital (Active)   Site Assessment WDL 04/04/24 2114   Dressing Type Transparent 04/04/24 2114   Line Status Blood return noted;Flushed 04/04/24 2114   Dressing Status Clean;Dry;Intact 04/04/24 2114       Peripheral IV 04/04/24 Right Antecubital (Active)   Site Assessment L 04/04/24 2114   Dressing Type Transparent 04/04/24 2114   Line Status Blood return noted;Flushed 04/04/24 2114   Dressing Status Clean;Dry;Intact 04/04/24 2114       Vitals   Vitals:    04/04/24 1500 04/04/24 1600 04/04/24 1809 04/05/24 0710   BP: 109/66 140/79 121/78 108/60   TempSrc:       Pulse: 99 91 64 67   Resp: 22 22 16 17   Patient Position - Orthostatic VS: Sitting Sitting     Temp:   98.8 °F (37.1 °C) 98.2 °F (36.8 °C)         EKG, Pathology, and/or Other Studies: I have personally reviewed pertinent reports.        Lab Results: I have personally reviewed pertinent reports.      Labs:    Results from last 7 days   Lab Units 04/05/24  0447 04/04/24  0751   WBC Thousand/uL 3.62* 3.46*   HEMOGLOBIN g/dL 10.6* 13.5   HEMATOCRIT % 31.2* 39.7   PLATELETS Thousands/uL 116* 142*   NEUTROS PCT % 29* 48   LYMPHS PCT % 45* 33   MONOS PCT % 16* 11   EOS PCT % 9* 8*      Results from last 7 days   Lab Units 04/05/24  0447   SODIUM mmol/L 142   POTASSIUM mmol/L 3.9   CHLORIDE mmol/L 108   CO2 mmol/L 29   BUN mg/dL 32*   CREATININE mg/dL 0.74   CALCIUM mg/dL 7.9*   ALK PHOS U/L 79   ALT U/L 31   AST U/L 32   MAGNESIUM mg/dL 1.7*      Results from last 7 days   Lab Units 04/04/24  0751   INR  0.96   PTT seconds 34     Results from last 7 days   Lab  "Units 04/04/24  0751   LACTIC ACID mmol/L 1.2     No results found for: \"TROPONINI\"          Invalid input(s): \"EXTPREGUR\"      Imaging Studies: I have personally reviewed pertinent reports.      Counseling / Coordination of Care  Total time spent today 21 minutes. This was a phone consultation.       "
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
## 2024-04-05 NOTE — RESPIRATORY THERAPY NOTE
04/04/24 2225   Respiratory Assessment   Assessment Type Assess only   General Appearance Drowsy   Respiratory Pattern Normal   Chest Assessment Chest expansion symmetrical   Bilateral Breath Sounds Diminished   Cough None   Resp Comments Pt placed on cpap at this time   O2 Device V30 2lpm bleed-in   Non-Invasive Information   O2 Interface Device Face mask   Non-Invasive Ventilation Mode CPAP   $ Intermittent NIV Yes   SpO2 96 %   $ Pulse Oximetry Spot Check Charge Completed   Non-Invasive Settings   FiO2 (%) 32   PEEP/CPAP (cm H2O) 4   Rise Time 2   Non-Invasive Readings   Skin Intervention Skin intact   Total Rate 12   Spontaneous Vt (mL) 458   Spontaneous MV (mL) 9   Leak (lpm) 40   Non-Invasive Alarms   Low Insp Pressure Time (sec) 60 sec   Vt Low (mL) 2   High Resp Rate (BPM) 40 BPM     RT Ventilator Management Note  Lalito Cramer 63 y.o. male MRN: [id number]  Unit/Bed#: -01 Encounter: [id number]      Daily Screen    No data found in the last 10 encounters.           Physical Exam:   Assessment Type: Assess only  General Appearance: Drowsy  Respiratory Pattern: Normal  Chest Assessment: Chest expansion symmetrical  Bilateral Breath Sounds: Diminished  Cough: None  O2 Device: V30 2lpm bleed-in      Resp Comments: Pt placed on cpap at this time
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
## 2024-04-05 NOTE — PLAN OF CARE
Problem: Potential for Falls  Goal: Patient will remain free of falls  Description: INTERVENTIONS:  - Educate patient/family on patient safety including physical limitations  - Instruct patient to call for assistance with activity   - Consult OT/PT to assist with strengthening/mobility   - Keep Call bell within reach  - Keep bed low and locked with side rails adjusted as appropriate  - Keep care items and personal belongings within reach  - Initiate and maintain comfort rounds  - Make Fall Risk Sign visible to staff  - Offer Toileting every 2 Hours, in advance of need  - Initiate/Maintain bed/chair alarm  - Obtain necessary fall risk management equipment: call bell within reach   - Apply yellow socks and bracelet for high fall risk patients  - Consider moving patient to room near nurses station  Outcome: Progressing

## 2024-04-06 LAB
ANION GAP SERPL CALCULATED.3IONS-SCNC: 5 MMOL/L (ref 4–13)
BASOPHILS # BLD AUTO: 0.02 THOUSANDS/ÂΜL (ref 0–0.1)
BASOPHILS NFR BLD AUTO: 1 % (ref 0–1)
BUN SERPL-MCNC: 28 MG/DL (ref 5–25)
CALCIUM SERPL-MCNC: 8.1 MG/DL (ref 8.4–10.2)
CHLORIDE SERPL-SCNC: 110 MMOL/L (ref 96–108)
CO2 SERPL-SCNC: 27 MMOL/L (ref 21–32)
CREAT SERPL-MCNC: 0.65 MG/DL (ref 0.6–1.3)
EOSINOPHIL # BLD AUTO: 0.21 THOUSAND/ÂΜL (ref 0–0.61)
EOSINOPHIL NFR BLD AUTO: 8 % (ref 0–6)
ERYTHROCYTE [DISTWIDTH] IN BLOOD BY AUTOMATED COUNT: 12.9 % (ref 11.6–15.1)
GFR SERPL CREATININE-BSD FRML MDRD: 102 ML/MIN/1.73SQ M
GLUCOSE SERPL-MCNC: 85 MG/DL (ref 65–140)
HCT VFR BLD AUTO: 31.8 % (ref 36.5–49.3)
HGB BLD-MCNC: 10.7 G/DL (ref 12–17)
IMM GRANULOCYTES # BLD AUTO: 0 THOUSAND/UL (ref 0–0.2)
IMM GRANULOCYTES NFR BLD AUTO: 0 % (ref 0–2)
LYMPHOCYTES # BLD AUTO: 1.7 THOUSANDS/ÂΜL (ref 0.6–4.47)
LYMPHOCYTES NFR BLD AUTO: 61 % (ref 14–44)
MAGNESIUM SERPL-MCNC: 2 MG/DL (ref 1.9–2.7)
MCH RBC QN AUTO: 31.6 PG (ref 26.8–34.3)
MCHC RBC AUTO-ENTMCNC: 33.6 G/DL (ref 31.4–37.4)
MCV RBC AUTO: 94 FL (ref 82–98)
MONOCYTES # BLD AUTO: 0.56 THOUSAND/ÂΜL (ref 0.17–1.22)
MONOCYTES NFR BLD AUTO: 20 % (ref 4–12)
NEUTROPHILS # BLD AUTO: 0.29 THOUSANDS/ÂΜL (ref 1.85–7.62)
NEUTS SEG NFR BLD AUTO: 10 % (ref 43–75)
NRBC BLD AUTO-RTO: 0 /100 WBCS
PLATELET # BLD AUTO: 113 THOUSANDS/UL (ref 149–390)
PMV BLD AUTO: 9.8 FL (ref 8.9–12.7)
POTASSIUM SERPL-SCNC: 4.4 MMOL/L (ref 3.5–5.3)
PROCALCITONIN SERPL-MCNC: 0.54 NG/ML
RBC # BLD AUTO: 3.39 MILLION/UL (ref 3.88–5.62)
SODIUM SERPL-SCNC: 142 MMOL/L (ref 135–147)
WBC # BLD AUTO: 2.78 THOUSAND/UL (ref 4.31–10.16)

## 2024-04-06 PROCEDURE — 80048 BASIC METABOLIC PNL TOTAL CA: CPT | Performed by: STUDENT IN AN ORGANIZED HEALTH CARE EDUCATION/TRAINING PROGRAM

## 2024-04-06 PROCEDURE — 84145 PROCALCITONIN (PCT): CPT | Performed by: STUDENT IN AN ORGANIZED HEALTH CARE EDUCATION/TRAINING PROGRAM

## 2024-04-06 PROCEDURE — 83735 ASSAY OF MAGNESIUM: CPT | Performed by: STUDENT IN AN ORGANIZED HEALTH CARE EDUCATION/TRAINING PROGRAM

## 2024-04-06 PROCEDURE — 97163 PT EVAL HIGH COMPLEX 45 MIN: CPT

## 2024-04-06 PROCEDURE — 85025 COMPLETE CBC W/AUTO DIFF WBC: CPT | Performed by: STUDENT IN AN ORGANIZED HEALTH CARE EDUCATION/TRAINING PROGRAM

## 2024-04-06 PROCEDURE — 99233 SBSQ HOSP IP/OBS HIGH 50: CPT | Performed by: STUDENT IN AN ORGANIZED HEALTH CARE EDUCATION/TRAINING PROGRAM

## 2024-04-06 PROCEDURE — 94760 N-INVAS EAR/PLS OXIMETRY 1: CPT

## 2024-04-06 PROCEDURE — 94664 DEMO&/EVAL PT USE INHALER: CPT

## 2024-04-06 PROCEDURE — 94640 AIRWAY INHALATION TREATMENT: CPT

## 2024-04-06 RX ORDER — LORAZEPAM 2 MG/ML
0.5 INJECTION INTRAMUSCULAR EVERY 8 HOURS PRN
Status: DISCONTINUED | OUTPATIENT
Start: 2024-04-06 | End: 2024-04-07

## 2024-04-06 RX ORDER — GUAIFENESIN/DEXTROMETHORPHAN 100-10MG/5
10 SYRUP ORAL EVERY 4 HOURS PRN
Status: DISCONTINUED | OUTPATIENT
Start: 2024-04-06 | End: 2024-04-10 | Stop reason: HOSPADM

## 2024-04-06 RX ADMIN — DULOXETINE HYDROCHLORIDE 60 MG: 60 CAPSULE, DELAYED RELEASE ORAL at 08:27

## 2024-04-06 RX ADMIN — ENOXAPARIN SODIUM 40 MG: 40 INJECTION SUBCUTANEOUS at 21:51

## 2024-04-06 RX ADMIN — CHLORDIAZEPOXIDE HYDROCHLORIDE 10 MG: 10 CAPSULE ORAL at 01:24

## 2024-04-06 RX ADMIN — CEFTRIAXONE SODIUM 2000 MG: 2 INJECTION, POWDER, FOR SOLUTION INTRAMUSCULAR; INTRAVENOUS at 08:44

## 2024-04-06 RX ADMIN — AZITHROMYCIN MONOHYDRATE 500 MG: 500 INJECTION, POWDER, LYOPHILIZED, FOR SOLUTION INTRAVENOUS at 08:57

## 2024-04-06 RX ADMIN — ACETAMINOPHEN 650 MG: 325 TABLET, FILM COATED ORAL at 16:27

## 2024-04-06 RX ADMIN — BUPRENORPHINE 4 MG: 2 TABLET SUBLINGUAL at 18:22

## 2024-04-06 RX ADMIN — PREGABALIN 100 MG: 100 CAPSULE ORAL at 17:07

## 2024-04-06 RX ADMIN — CHLORDIAZEPOXIDE HYDROCHLORIDE 10 MG: 10 CAPSULE ORAL at 17:07

## 2024-04-06 RX ADMIN — ACETAMINOPHEN 650 MG: 325 TABLET, FILM COATED ORAL at 04:32

## 2024-04-06 RX ADMIN — CHLORDIAZEPOXIDE HYDROCHLORIDE 10 MG: 10 CAPSULE ORAL at 08:27

## 2024-04-06 RX ADMIN — ACETAMINOPHEN 650 MG: 325 TABLET, FILM COATED ORAL at 21:50

## 2024-04-06 RX ADMIN — Medication 3 MG: at 21:51

## 2024-04-06 RX ADMIN — GUAIFENESIN AND DEXTROMETHORPHAN 10 ML: 100; 10 SYRUP ORAL at 21:51

## 2024-04-06 RX ADMIN — PREGABALIN 100 MG: 100 CAPSULE ORAL at 08:26

## 2024-04-06 RX ADMIN — LEVETIRACETAM 500 MG: 500 TABLET, FILM COATED ORAL at 08:27

## 2024-04-06 RX ADMIN — LORAZEPAM 0.5 MG: 2 INJECTION INTRAMUSCULAR; INTRAVENOUS at 21:51

## 2024-04-06 RX ADMIN — BUPRENORPHINE 4 MG: 2 TABLET SUBLINGUAL at 06:39

## 2024-04-06 RX ADMIN — ACETAMINOPHEN 650 MG: 325 TABLET, FILM COATED ORAL at 09:55

## 2024-04-06 RX ADMIN — BUPRENORPHINE 4 MG: 2 TABLET SUBLINGUAL at 14:36

## 2024-04-06 RX ADMIN — LEVETIRACETAM 500 MG: 500 TABLET, FILM COATED ORAL at 21:50

## 2024-04-06 RX ADMIN — LORAZEPAM 0.5 MG: 2 INJECTION INTRAMUSCULAR; INTRAVENOUS at 09:54

## 2024-04-06 RX ADMIN — ALBUTEROL SULFATE 2.5 MG: 2.5 SOLUTION RESPIRATORY (INHALATION) at 08:56

## 2024-04-06 NOTE — PLAN OF CARE
Problem: PHYSICAL THERAPY ADULT  Goal: Performs mobility at highest level of function for planned discharge setting.  See evaluation for individualized goals.  Description: Treatment/Interventions: Functional transfer training, LE strengthening/ROM, Therapeutic exercise, Endurance training, Gait training, Bed mobility, Equipment eval/education, Patient/family training, Spoke to nursing, OT          See flowsheet documentation for full assessment, interventions and recommendations.  Outcome: Progressing  Note: Prognosis: Good  Problem List: Decreased strength, Decreased endurance, Impaired balance, Decreased mobility, Pain, Decreased cognition  Assessment: Pt is 63 y.o. male seen for PT evaluation s/p admit to [facility] on 4/4/2024 w/ Sepsis (HCC). PT consulted to assess pt's functional mobility and d/c needs. Order placed for PT eval and tx, w/  activity  order. Comorbidities affecting pt's physical performance at time of assessment include: Pneumonia, sepsis, polysubstance abuse, seizures, chronic pain, R rib pain with recent rib fx. PTA, pt was independent w/ all functional mobility w/ no AD . Personal factors affecting pt at time of IE include: lives in multi story house, ambulating w/ assistive device, and stairs to enter home. Please find objective findings from PT assessment regarding body systems outlined above with impairments and limitations including weakness, impaired balance, gait deviations, pain, decreased functional mobility tolerance, and dizziness . The following objective measures performed on IE also reveal limitations: AM-PAC 6-Clicks: 19/24. Pt's clinical presentation is currently unstable/unpredictable seen in pt's presentation. Pt to benefit from continued PT tx to address deficits as defined above and maximize level of functional independent mobility and consistency. From PT/mobility standpoint, recommendation at time of d/c would be Minimum Resource Intensity pending progress in  order to facilitate return to PLOF.  Barriers to Discharge: None     Rehab Resource Intensity Level, PT: III (Minimum Resource Intensity)    See flowsheet documentation for full assessment.

## 2024-04-06 NOTE — ASSESSMENT & PLAN NOTE
History of alcohol and opioid use disorder  He had history of oxycodone abuse and valium abuse   He was at a detox facility for the past 11 days  Currently on Subutex 4 mg 4 times daily and Librium 10 mg 3 times daily  ED spoke with provider at [facility] and reordered his withdrawal medications (as above)  Continue subutex and librium   - Family and patient requesting return to drug/alchol rehab on discharge   - Discussed with toxicology and they did not recommend any further short-acting benzos at this time   - Discussed with family   - continue librium and will add a PRN ativan 0.5 mg IV for agitation

## 2024-04-06 NOTE — RESPIRATORY THERAPY NOTE
RT Protocol Note  Lalito Cramer 63 y.o. male MRN: [id number]  Unit/Bed#: -01 Encounter: [id number]    Assessment    Principal Problem:    Sepsis (HCC)  Active Problems:    Pneumonia    Polysubstance abuse (HCC)    Seizures (HCC)    Hyperlipidemia    Chronic pain      Home Pulmonary Medications:  None    Home Devices/Therapy: BiPAP/CPAP    History reviewed. No pertinent past medical history.  Social History     Socioeconomic History    Marital status: /Civil Union     Spouse name: None    Number of children: None    Years of education: None    Highest education level: None   Occupational History    None   Tobacco Use    Smoking status: Never    Smokeless tobacco: Never   Substance and Sexual Activity    Alcohol use: Not Currently    Drug use: None    Sexual activity: None   Other Topics Concern    None   Social History Narrative    None     Social Determinants of Health     Financial Resource Strain: Not on file   Food Insecurity: No Food Insecurity (4/5/2024)    Hunger Vital Sign     Worried About Running Out of Food in the Last Year: Never true     Ran Out of Food in the Last Year: Never true   Transportation Needs: No Transportation Needs (4/5/2024)    PRAPARE - Transportation     Lack of Transportation (Medical): No     Lack of Transportation (Non-Medical): No   Physical Activity: Not on file   Stress: Not on file   Social Connections: Not on file   Intimate Partner Violence: Not on file   Housing Stability: Low Risk  (4/5/2024)    Housing Stability Vital Sign     Unable to Pay for Housing in the Last Year: No     Number of Places Lived in the Last Year: 1     Unstable Housing in the Last Year: No       Subjective         Objective    Physical Exam:   Assessment Type: During-treatment  General Appearance: Awake, Alert  Respiratory Pattern: Normal  Chest Assessment: Chest expansion symmetrical  Bilateral Breath Sounds: Diminished  Cough: Non-productive, Dry  O2 Device: RA    Vitals:  Blood pressure  "108/75, pulse (P) 76, temperature 97.5 °F (36.4 °C), resp. rate (P) 18, height 5' 9\" (1.753 m), weight 90.7 kg (200 lb), SpO2 94%.          Imaging and other studies: I have personally reviewed pertinent reports.      O2 Device: RA     Plan    Respiratory Plan: No distress/Pulmonary history        Resp Comments: Pt having coughing fit upon entering the room.  Pt w/ no past pulm hx; uses no meds at home, but does utilize CPAP.  Pt requesting PRN at this time.  Will cont w/ PRN.   "

## 2024-04-06 NOTE — UTILIZATION REVIEW
NOTIFICATION OF INPATIENT ADMISSION   AUTHORIZATION REQUEST   SERVICING FACILITY:   [place] [address]  Tax ID: [id number]  NPI: [id number] ATTENDING PROVIDER:  Attending Name and NPI#: Blake Neri Md [[id number]]  Address: [address] [place] [address]  Phone: [phone]     ADMISSION INFORMATION:  Place of Service: Inpatient [facility]  Place of Service Code: 21  Inpatient Admission Date/Time: 4/4/24  9:21 AM  Discharge Date/Time: No discharge date for patient encounter.  Admitting Diagnosis Code/Description:  Chest wall pain [R07.89]  Chest pain [R07.9]  Pneumonia [J18.9]  SOB (shortness of breath) [R06.02]  Hypoxia [R09.02]  Fever [R50.9]     UTILIZATION REVIEW CONTACT:  Charlene Rodas, Utilization   Network Utilization Review Department  Phone: [phone]  Fax [phone]  Email: [email or web address]  Contact for approvals/pending authorizations, clinical reviews, and discharge.     PHYSICIAN ADVISORY SERVICES:  Medical Necessity Denial & Ospn-wf-Vgdw Review  Phone: [phone]  Fax: [phone]  Email: [email or web address]     DISCHARGE SUPPORT TEAM:  For Patients Discharge Needs & Updates  Phone: [phone] opt. 2 Fax: [phone]  Email: [email or web address]

## 2024-04-06 NOTE — RESPIRATORY THERAPY NOTE
04/06/24 0410   Respiratory Assessment   Resp Comments Pt found off cpap   Non-Invasive Information   SpO2 (!) 87 %     RT Ventilator Management Note  Lalito Cramer 63 y.o. male MRN: [id number]  Unit/Bed#: -01 Encounter: [id number]      Daily Screen    No data found in the last 10 encounters.           Physical Exam:   Assessment Type: Assess only  General Appearance: Alert, Awake  Respiratory Pattern: Normal  Chest Assessment: Chest expansion symmetrical  Bilateral Breath Sounds: Diminished  Cough: None  O2 Device: V30 2lpm bleed-in      Resp Comments: Pt found off cpap

## 2024-04-06 NOTE — PLAN OF CARE
Problem: Potential for Falls  Goal: Patient will remain free of falls  Description: INTERVENTIONS:  - Educate patient/family on patient safety including physical limitations  - Instruct patient to call for assistance with activity   - Consult OT/PT to assist with strengthening/mobility   - Keep Call bell within reach  - Keep bed low and locked with side rails adjusted as appropriate  - Keep care items and personal belongings within reach  - Initiate and maintain comfort rounds  - Make Fall Risk Sign visible to staff  - Offer Toileting every 2 Hours, in advance of need  - Initiate/Maintain bedalarm  - Obtain necessary fall risk management equipment:   - Apply yellow socks and bracelet for high fall risk patients  - Consider moving patient to room near nurses station  Outcome: Progressing     Problem: Prexisting or High Potential for Compromised Skin Integrity  Goal: Skin integrity is maintained or improved  Description: INTERVENTIONS:  - Identify patients at risk for skin breakdown  - Assess and monitor skin integrity  - Assess and monitor nutrition and hydration status  - Monitor labs   - Assess for incontinence   - Turn and reposition patient  - Assist with mobility/ambulation  - Relieve pressure over bony prominences  - Avoid friction and shearing  - Provide appropriate hygiene as needed including keeping skin clean and dry  - Evaluate need for skin moisturizer/barrier cream  - Collaborate with interdisciplinary team   - Patient/family teaching  - Consider wound care consult   Outcome: Progressing     Problem: PAIN - ADULT  Goal: Verbalizes/displays adequate comfort level or baseline comfort level  Description: Interventions:  - Encourage patient to monitor pain and request assistance  - Assess pain using appropriate pain scale  - Administer analgesics based on type and severity of pain and evaluate response  - Implement non-pharmacological measures as appropriate and evaluate response  - Consider cultural and  social influences on pain and pain management  - Notify physician/advanced practitioner if interventions unsuccessful or patient reports new pain  Outcome: Progressing     Problem: INFECTION - ADULT  Goal: Absence or prevention of progression during hospitalization  Description: INTERVENTIONS:  - Assess and monitor for signs and symptoms of infection  - Monitor lab/diagnostic results  - Monitor all insertion sites, i.e. indwelling lines, tubes, and drains  - Monitor endotracheal if appropriate and nasal secretions for changes in amount and color  - [place] appropriate cooling/warming therapies per order  - Administer medications as ordered  - Instruct and encourage patient and family to use good hand hygiene technique  - Identify and instruct in appropriate isolation precautions for identified infection/condition  Outcome: Progressing  Goal: Absence of fever/infection during neutropenic period  Description: INTERVENTIONS:  - Monitor WBC    Outcome: Progressing     Problem: SAFETY ADULT  Goal: Patient will remain free of falls  Description: INTERVENTIONS:  - Educate patient/family on patient safety including physical limitations  - Instruct patient to call for assistance with activity   - Consult OT/PT to assist with strengthening/mobility   - Keep Call bell within reach  - Keep bed low and locked with side rails adjusted as appropriate  - Keep care items and personal belongings within reach  - Initiate and maintain comfort rounds  - Make Fall Risk Sign visible to staff  - Offer Toileting every 2 Hours, in advance of need  - Initiate/Maintain bedalarm  - Obtain necessary fall risk management equipment:   - Apply yellow socks and bracelet for high fall risk patients  - Consider moving patient to room near nurses station  Outcome: Progressing  Goal: Maintain or return to baseline ADL function  Description: INTERVENTIONS:  -  Assess patient's ability to carry out ADLs; assess patient's baseline for ADL function and  identify physical deficits which impact ability to perform ADLs (bathing, care of mouth/teeth, toileting, grooming, dressing, etc.)  - Assess/evaluate cause of self-care deficits   - Assess range of motion  - Assess patient's mobility; develop plan if impaired  - Assess patient's need for assistive devices and provide as appropriate  - Encourage maximum independence but intervene and supervise when necessary  - Involve family in performance of ADLs  - Assess for home care needs following discharge   - Consider OT consult to assist with ADL evaluation and planning for discharge  - Provide patient education as appropriate  Outcome: Progressing  Goal: Maintains/Returns to pre admission functional level  Description: INTERVENTIONS:  - Perform AM-PAC 6 Click Basic Mobility/ Daily Activity assessment daily.  - Set and communicate daily mobility goal to care team and patient/family/caregiver.   - Collaborate with rehabilitation services on mobility goals if consulted  - Perform Range of Motion 3 times a day.  - Reposition patient every 2 hours.  - Dangle patient 3 times a day  - Stand patient 3 times a day  - Ambulate patient 3 times a day  - Out of bed to chair 3 times a day   - Out of bed for meals 3 times a day  - Out of bed for toileting  - Record patient progress and toleration of activity level   Outcome: Progressing     Problem: DISCHARGE PLANNING  Goal: Discharge to home or other facility with appropriate resources  Description: INTERVENTIONS:  - Identify barriers to discharge w/patient and caregiver  - Arrange for needed discharge resources and transportation as appropriate  - Identify discharge learning needs (meds, wound care, etc.)  - Arrange for interpretive services to assist at discharge as needed  - Refer to Case Management Department for coordinating discharge planning if the patient needs post-hospital services based on physician/advanced practitioner order or complex needs related to functional status,  cognitive ability, or social support system  Outcome: Progressing     Problem: Knowledge Deficit  Goal: Patient/family/caregiver demonstrates understanding of disease process, treatment plan, medications, and discharge instructions  Description: Complete learning assessment and assess knowledge base.  Interventions:  - Provide teaching at level of understanding  - Provide teaching via preferred learning methods  Outcome: Progressing     Problem: METABOLIC, FLUID AND ELECTROLYTES - ADULT  Goal: Electrolytes maintained within normal limits  Description: INTERVENTIONS:  - Monitor labs and assess patient for signs and symptoms of electrolyte imbalances  - Administer electrolyte replacement as ordered  - Monitor response to electrolyte replacements, including repeat lab results as appropriate  - Instruct patient on fluid and nutrition as appropriate  Outcome: Progressing  Goal: Fluid balance maintained  Description: INTERVENTIONS:  - Monitor labs   - Monitor I/O and WT  - Instruct patient on fluid and nutrition as appropriate  - Assess for signs & symptoms of volume excess or deficit  Outcome: Progressing  Goal: Glucose maintained within target range  Description: INTERVENTIONS:  - Monitor Blood Glucose as ordered  - Assess for signs and symptoms of hyperglycemia and hypoglycemia  - Administer ordered medications to maintain glucose within target range  - Assess nutritional intake and initiate nutrition service referral as needed  Outcome: Progressing

## 2024-04-06 NOTE — PROGRESS NOTES
Critical access hospital  Progress Note  Name: Lalito Cramer I  MRN: [id number]  Unit/Bed#: -01 I Date of Admission: 4/4/2024   Date of Service: 4/6/2024 I Hospital Day: 2    Assessment/Plan   * Sepsis (HCC)  Assessment & Plan  Presenting with concerns of PNA with sob and hypoxia  , febrile to 101.9F, hypoxic to low 80s on room air now requiring 4L NC, WBC 3.4  Received 1 liter NS in the ED   Started on azithro/ctx for possible PNA   Sepsis power plan  Follow up blood and urine cultures      Pneumonia  Assessment & Plan  Presenting from drug/alcohol rehab facility with SOB and hypoxia requiring 4L NC   He reported this was ongoing for the last several days but worsened today and he developed some confusion  Confusion started a few days prior to admission while he was drug/alcohol rehab   Covid/rvp negative   Started on azithro/ctx in the ED - continue   Sending urinary antigens - negative   Procal - elevated       Polysubstance abuse (HCC)  Assessment & Plan  History of alcohol and opioid use disorder  He had history of oxycodone abuse and valium abuse   He was at a detox facility for the past 11 days  Currently on Subutex 4 mg 4 times daily and Librium 10 mg 3 times daily  ED spoke with provider at [facility] and reordered his withdrawal medications (as above)  Continue subutex and librium   - Family and patient requesting return to drug/alchol rehab on discharge   - Discussed with toxicology and they did not recommend any further short-acting benzos at this time   - Discussed with family   - continue librium and will add a PRN ativan 0.5 mg IV for agitation     Chronic pain  Assessment & Plan  Continue Lyrica and duloxetine  Added Toradol for moderate to severe pain  Avoid narcotics given history of abuse unless absolutely necessary.    Hyperlipidemia  Assessment & Plan  Continue statin    Seizures (HCC)  Assessment & Plan  Continue keppra   History is unclear to the patient or family  whether he actually has a history of seizures.  However was found to be on medication list so we will continue for now.               VTE Pharmacologic Prophylaxis:   Moderate Risk (Score 3-4) - Pharmacological DVT Prophylaxis Ordered: enoxaparin (Lovenox).    Mobility:   Basic Mobility Inpatient Raw Score: 19  JH-HLM Goal: 6: Walk 10 steps or more  JH-HLM Achieved: 7: Walk 25 feet or more  JH-HLM Goal achieved. Continue to encourage appropriate mobility.    Patient Centered Rounds: I performed bedside rounds with nursing staff today.   Discussions with Specialists or Other Care Team Provider: none    Education and Discussions with Family / Patient: Updated  (son and daughter) via phone.    Total Time Spent on Date of Encounter in care of patient: 55 mins. This time was spent on one or more of the following: performing physical exam; counseling and coordination of care; obtaining or reviewing history; documenting in the medical record; reviewing/ordering tests, medications or procedures; communicating with other healthcare professionals and discussing with patient's family/caregivers.    Current Length of Stay: 2 day(s)  Current Patient Status: Inpatient   Certification Statement: The patient will continue to require additional inpatient hospital stay due to PNA, mental status changes, withdrawal  Discharge Plan: Anticipate discharge in 24-48 hrs to drug rehab center    Code Status: Level 1 - Full Code    Subjective:   No new issues overnight or in the morning. He is calm and cooperative. Has a cough otherwise no new symptoms reported.     Objective:     Vitals:   Temp (24hrs), Av.9 °F (36.6 °C), Min:97.5 °F (36.4 °C), Max:98.3 °F (36.8 °C)    Temp:  [97.5 °F (36.4 °C)-98.3 °F (36.8 °C)] 97.5 °F (36.4 °C)  HR:  [57-93] 70  Resp:  [18-20] 18  BP: (108-153)/(75-77) 108/75  SpO2:  [87 %-98 %] 94 %  Body mass index is 29.53 kg/m².     Input and Output Summary (last 24 hours):     Intake/Output Summary  (Last 24 hours) at 4/6/2024 1513  Last data filed at 4/6/2024 1446  Gross per 24 hour   Intake --   Output 900 ml   Net -900 ml       Physical Exam:   Physical Exam   NAD  Nonlabored resp on RA   RRR  NTND abd  No pitting edema  aaox2    Additional Data:     Labs:  Results from last 7 days   Lab Units 04/06/24  0601   WBC Thousand/uL 2.78*   HEMOGLOBIN g/dL 10.7*   HEMATOCRIT % 31.8*   PLATELETS Thousands/uL 113*   NEUTROS PCT % 10*   LYMPHS PCT % 61*   MONOS PCT % 20*   EOS PCT % 8*     Results from last 7 days   Lab Units 04/06/24  0601 04/05/24  0447   SODIUM mmol/L 142 142   POTASSIUM mmol/L 4.4 3.9   CHLORIDE mmol/L 110* 108   CO2 mmol/L 27 29   BUN mg/dL 28* 32*   CREATININE mg/dL 0.65 0.74   ANION GAP mmol/L 5 5   CALCIUM mg/dL 8.1* 7.9*   ALBUMIN g/dL  --  3.3*   TOTAL BILIRUBIN mg/dL  --  1.05*   ALK PHOS U/L  --  79   ALT U/L  --  31   AST U/L  --  32   GLUCOSE RANDOM mg/dL 85 87     Results from last 7 days   Lab Units 04/04/24  0751   INR  0.96             Results from last 7 days   Lab Units 04/06/24  0601 04/05/24  0447 04/04/24  0751   LACTIC ACID mmol/L  --   --  1.2   PROCALCITONIN ng/ml 0.54* 0.92* 0.14       Lines/Drains:  Invasive Devices       Peripheral Intravenous Line  Duration             Peripheral IV 04/04/24 Left Antecubital 2 days    Peripheral IV 04/04/24 Right Antecubital 2 days                          Imaging: Reviewed radiology reports from this admission including: chest xray    Recent Cultures (last 7 days):   Results from last 7 days   Lab Units 04/04/24  1515 04/04/24  0821 04/04/24  0812   BLOOD CULTURE   --  No Growth at 48 hrs. No Growth at 48 hrs.   LEGIONELLA URINARY ANTIGEN  Negative  --   --        Last 24 Hours Medication List:   Current Facility-Administered Medications   Medication Dose Route Frequency Provider Last Rate    acetaminophen  650 mg Oral Q6H Blake Neri MD      albuterol  2.5 mg Nebulization Q6H PRN Blake Neri MD      azithromycin  500 mg Intravenous Q24H  Blake Neri MD      buprenorphine  4 mg Sublingual 4x Daily Korey Cam MD      cefTRIAXone  2,000 mg Intravenous Q24H Blake Neri MD 2,000 mg (04/06/24 0844)    chlordiazePOXIDE  10 mg Oral Q8H Count includes the [facility] Korey Cam MD      dextromethorphan-guaiFENesin  10 mL Oral Q4H PRN Blake Neri MD      DULoxetine  60 mg Oral Daily Blake Neri MD      enoxaparin  40 mg Subcutaneous Daily Blake Neri MD      levETIRAcetam  500 mg Oral Q12H Count includes the [facility] Blake Neri MD      LORazepam  0.5 mg Intravenous Q8H PRN Blake Neri MD      melatonin  3 mg Oral HS PRN JAMES Mckeon      polyethylene glycol  17 g Oral Daily Blake Neri MD      pregabalin  100 mg Oral BID Blake Neri MD          Today, Patient Was Seen By: Blake Neri MD    **Please Note: This note may have been constructed using a voice recognition system.**

## 2024-04-06 NOTE — RESPIRATORY THERAPY NOTE
04/05/24 2201   Respiratory Assessment   Assessment Type Assess only   General Appearance Alert;Awake   Respiratory Pattern Normal   Chest Assessment Chest expansion symmetrical   Bilateral Breath Sounds Diminished   Cough None   Resp Comments Pt placed on cpap at this time   O2 Device V30 2lpm bleed-in   Non-Invasive Information   O2 Interface Device Face mask   Non-Invasive Ventilation Mode CPAP   $ Intermittent NIV Yes   SpO2 91 %   $ Pulse Oximetry Spot Check Charge Completed   Non-Invasive Settings   FiO2 (%) 32   PEEP/CPAP (cm H2O) 4   Rise Time 2   Non-Invasive Readings   Skin Intervention Skin intact   Total Rate 13   Spontaneous Vt (mL) 488   Spontaneous MV (mL) 6.6   Leak (lpm) 40   Non-Invasive Alarms   Low Insp Pressure Time (sec) 60 sec   Vt Low (mL) 2   High Resp Rate (BPM) 40 BPM   Apnea Interval (sec) 30     RT Ventilator Management Note  Lalito Cramer 63 y.o. male MRN: [id number]  Unit/Bed#: -01 Encounter: [id number]      Daily Screen    No data found in the last 10 encounters.           Physical Exam:   Assessment Type: Assess only  General Appearance: Alert, Awake  Respiratory Pattern: Normal  Chest Assessment: Chest expansion symmetrical  Bilateral Breath Sounds: Diminished  Cough: None  O2 Device: V30 2lpm bleed-in      Resp Comments: Pt placed on cpap at this time

## 2024-04-06 NOTE — ASSESSMENT & PLAN NOTE
Presenting from drug/alcohol rehab facility with SOB and hypoxia requiring 4L NC   He reported this was ongoing for the last several days but worsened today and he developed some confusion  Confusion started a few days prior to admission while he was drug/alcohol rehab   Covid/rvp negative   Started on azithro/ctx in the ED - continue   Sending urinary antigens - negative   Procal - elevated

## 2024-04-06 NOTE — PLAN OF CARE
Problem: PAIN - ADULT  Goal: Verbalizes/displays adequate comfort level or baseline comfort level  Description: Interventions:  - Encourage patient to monitor pain and request assistance  - Assess pain using appropriate pain scale  - Administer analgesics based on type and severity of pain and evaluate response  - Implement non-pharmacological measures as appropriate and evaluate response  - Consider cultural and social influences on pain and pain management  - Notify physician/advanced practitioner if interventions unsuccessful or patient reports new pain  Outcome: Progressing     Problem: SAFETY ADULT  Goal: Patient will remain free of falls  Description: INTERVENTIONS:  - Educate patient/family on patient safety including physical limitations  - Instruct patient to call for assistance with activity   - Consult OT/PT to assist with strengthening/mobility   - Keep Call bell within reach  - Keep bed low and locked with side rails adjusted as appropriate  - Keep care items and personal belongings within reach  - Initiate and maintain comfort rounds  - Make Fall Risk Sign visible to staff  - Offer Toileting every 2 Hours, in advance of need  - Initiate/Maintain bed/chair alarm  - Obtain necessary fall risk management equipment:   - Apply yellow socks and bracelet for high fall risk patients  - Consider moving patient to room near nurses station  Outcome: Progressing  Goal: Maintain or return to baseline ADL function  Description: INTERVENTIONS:  -  Assess patient's ability to carry out ADLs; assess patient's baseline for ADL function and identify physical deficits which impact ability to perform ADLs (bathing, care of mouth/teeth, toileting, grooming, dressing, etc.)  - Assess/evaluate cause of self-care deficits   - Assess range of motion  - Assess patient's mobility; develop plan if impaired  - Assess patient's need for assistive devices and provide as appropriate  - Encourage maximum independence but intervene  and supervise when necessary  - Involve family in performance of ADLs  - Assess for home care needs following discharge   - Consider OT consult to assist with ADL evaluation and planning for discharge  - Provide patient education as appropriate  Outcome: Progressing  Goal: Maintains/Returns to pre admission functional level  Description: INTERVENTIONS:  - Perform AM-PAC 6 Click Basic Mobility/ Daily Activity assessment daily.  - Set and communicate daily mobility goal to care team and patient/family/caregiver.   - Collaborate with rehabilitation services on mobility goals if consulted  - Ambulate patient 3 times a day  - Out of bed for meals 3   Problem: DISCHARGE PLANNING  Goal: Discharge to home or other facility with appropriate resources  Description: INTERVENTIONS:  - Identify barriers to discharge w/patient and caregiver  - Arrange for needed discharge resources and transportation as appropriate  - Identify discharge learning needs (meds, wound care, etc.)  - Arrange for interpretive services to assist at discharge as needed  - Refer to Case Management Department for coordinating discharge planning if the patient needs post-hospital services based on physician/advanced practitioner order or complex needs related to functional status, cognitive ability, or social support system  Outcome: Progressing     Problem: Knowledge Deficit  Goal: Patient/family/caregiver demonstrates understanding of disease process, treatment plan, medications, and discharge instructions  Description: Complete learning assessment and assess knowledge base.  Interventions:  - Provide teaching at level of understanding  - Provide teaching via preferred learning methods  Outcome: Progressing   times a day  - Out of bed for toileting  - Record patient progress and toleration of activity level   Outcome: Progressing

## 2024-04-06 NOTE — PHYSICAL THERAPY NOTE
Physical Therapy Evaluation     Patient's Name: Lalito Cramer    Admitting Diagnosis  Chest wall pain [R07.89]  Chest pain [R07.9]  Pneumonia [J18.9]  SOB (shortness of breath) [R06.02]  Hypoxia [R09.02]  Fever [R50.9]    Problem List  Patient Active Problem List   Diagnosis    Pneumonia    Sepsis (HCC)    Polysubstance abuse (HCC)    Seizures (HCC)    Hyperlipidemia    Chronic pain       Past Medical History  History reviewed. No pertinent past medical history.    Past Surgical History  History reviewed. No pertinent surgical history.     04/06/24 1005   PT Last Visit   PT Visit Date 04/06/24   Note Type   Note type Evaluation   Pain Assessment   Pain Assessment Tool 0-10   Pain Score 3   Pain Location/Orientation Orientation: Right;Location: Rib Cage   Restrictions/Precautions   Other Precautions Chair Alarm;Bed Alarm;O2;Fall Risk;Pain   Home Living   Type of Home House   Home Layout Two level;Bed/bath upstairs   Bathroom Shower/Tub Walk-in shower   Bathroom Toilet Standard   Bathroom Equipment Grab bars in shower   Home Equipment   (no dme at baseline)   Prior Function   Level of [place] Independent with ADLs;Independent with functional mobility;Independent with IADLS   Lives With Spouse   IADLs Independent with driving;Family/Friend/Other provides meals;Independent with medication management  (shares meal prep)   Falls in the last 6 months 0   General   Additional Pertinent History Admitted from [place] for drug/alcohol rehab   Cognition   Overall Cognitive Status Impaired   Orientation Level Oriented to person;Oriented to situation  (knew hosptial, not which one; knew year not month/day)   Following Commands Follows one step commands without difficulty   Subjective   Subjective I am having some pain. The nurse just gave me something. I feel better.   RLE Assessment   RLE Assessment X   Strength RLE   RLE Overall Strength 4/5   LLE Assessment   LLE Assessment X   Strength LLE   LLE Overall Strength 4/5  "  Light Touch   RLE Light Touch Grossly intact   LLE Light Touch Grossly intact   Bed Mobility   Supine to Sit 5  Supervision   Additional items HOB elevated;Increased time required   Sit to Supine 6  Modified independent   Additional items Increased time required;HOB elevated   Transfers   Sit to Stand   (CGAx1)   Additional items Assist x 1;Increased time required;Verbal cues   Stand to Sit   (CGAx1)   Additional items Assist x 1;Increased time required   Additional Comments First time pt. OOB, slightly out of it/impaired cognition. Did have bouts of walking faster with momentum with cues to decrease speed. hands held in at stomach due to rib pain. Pt. able to ambulate 90' and at that time indicated he  couldn't go further as he \"overdid\" it. He reported dizziness. W/C obtained to transport back to room. at that time pt. declined dizziness, requested to get back to bed.   Ambulation/Elevation   Gait pattern Decreased foot clearance;Shuffling;Short stride;Wide NICO   Gait Assistance   (CGAx1)   Additional items Verbal cues;Tactile cues;Assist x 1   Assistive Device   (no AD)   Distance 90'   Stair Management Assistance Not tested   Balance   Static Sitting Good   Dynamic Sitting Fair   Static Standing Fair   Dynamic Standing Fair -   Ambulatory Fair  (flucutating fair to fair-)   Endurance Deficit   Endurance Deficit Yes   Endurance Deficit Description dizziness, fatigue   Activity Tolerance   Activity Tolerance Patient limited by pain;Patient limited by fatigue   Nurse Made Aware RN ok to see, notified on function   Assessment   Prognosis Good   Problem List Decreased strength;Decreased endurance;Impaired balance;Decreased mobility;Pain;Decreased cognition   Assessment Pt is 63 y.o. male seen for PT evaluation s/p admit to [facility] on 4/4/2024 w/ Sepsis (HCC). PT consulted to assess pt's functional mobility and d/c needs. Order placed for PT eval and tx, w/  activity  order. Comorbidities affecting pt's " physical performance at time of assessment include: Pneumonia, sepsis, polysubstance abuse, seizures, chronic pain, R rib pain with recent rib fx. PTA, pt was independent w/ all functional mobility w/ no AD . Personal factors affecting pt at time of IE include: lives in multi story house, ambulating w/ assistive device, and stairs to enter home. Please find objective findings from PT assessment regarding body systems outlined above with impairments and limitations including weakness, impaired balance, gait deviations, pain, decreased functional mobility tolerance, and dizziness . The following objective measures performed on IE also reveal limitations: AM-PAC 6-Clicks: 19/24. Pt's clinical presentation is currently unstable/unpredictable seen in pt's presentation. Pt to benefit from continued PT tx to address deficits as defined above and maximize level of functional independent mobility and consistency. From PT/mobility standpoint, recommendation at time of d/c would be Minimum Resource Intensity pending progress in order to facilitate return to PLOF.   Barriers to Discharge None   Goals   Patient Goals to go to rehab again   STG Expiration Date 04/20/24   Short Term Goal #1 1. Pt will complete bed mobility with Mod I to increase functional mobility. 2. Pt will complete sit to stand transfers with Mod I to increase functional mobility. 3. Pt will ambulate 150 ft with LRAD with Mod I without LOB 4. Pt will increase B/L LE strength by 1 grade to facilitate improved functional mobility with decreased risk of falls. 5. Pt will increase standing balance to fair in order to decrease risk of falls. 6. Pt. Will navigate 5 to 10 stairs with close S using HR.   PT Treatment Day 0   Plan   Treatment/Interventions Functional transfer training;LE strengthening/ROM;Therapeutic exercise;Endurance training;Gait training;Bed mobility;Equipment eval/education;Patient/family training;Spoke to nursing;OT   PT Frequency 3-5x/wk    Discharge Recommendation   Rehab Resource Intensity Level, PT III (Minimum Resource Intensity)   AM-PAC Basic Mobility Inpatient   Turning in Flat Bed Without Bedrails 4   Lying on Back to Sitting on Edge of Flat Bed Without Bedrails 3   Moving Bed to Chair 3   Standing Up From Chair Using Arms 3   Walk in Room 3   Climb 3-5 Stairs With Railing 3   Basic Mobility Inpatient Raw Score 19   Basic Mobility Standardized Score 42.48   [facility] Highest Level Of Mobility   -HLM Goal 6: Walk 10 steps or more   JH-HLM Achieved 7: Walk 25 feet or more           Adelaida Patton, PT

## 2024-04-07 LAB
ANION GAP SERPL CALCULATED.3IONS-SCNC: 2 MMOL/L (ref 4–13)
BASOPHILS # BLD AUTO: 0.02 THOUSANDS/ÂΜL (ref 0–0.1)
BASOPHILS NFR BLD AUTO: 1 % (ref 0–1)
BUN SERPL-MCNC: 22 MG/DL (ref 5–25)
CALCIUM SERPL-MCNC: 8.1 MG/DL (ref 8.4–10.2)
CHLORIDE SERPL-SCNC: 110 MMOL/L (ref 96–108)
CO2 SERPL-SCNC: 28 MMOL/L (ref 21–32)
CREAT SERPL-MCNC: 0.58 MG/DL (ref 0.6–1.3)
EOSINOPHIL # BLD AUTO: 0.3 THOUSAND/ÂΜL (ref 0–0.61)
EOSINOPHIL NFR BLD AUTO: 14 % (ref 0–6)
ERYTHROCYTE [DISTWIDTH] IN BLOOD BY AUTOMATED COUNT: 12.8 % (ref 11.6–15.1)
GFR SERPL CREATININE-BSD FRML MDRD: 107 ML/MIN/1.73SQ M
GLUCOSE SERPL-MCNC: 106 MG/DL (ref 65–140)
HCT VFR BLD AUTO: 31.5 % (ref 36.5–49.3)
HGB BLD-MCNC: 10.8 G/DL (ref 12–17)
IMM GRANULOCYTES # BLD AUTO: 0 THOUSAND/UL (ref 0–0.2)
IMM GRANULOCYTES NFR BLD AUTO: 0 % (ref 0–2)
LYMPHOCYTES # BLD AUTO: 1.27 THOUSANDS/ÂΜL (ref 0.6–4.47)
LYMPHOCYTES NFR BLD AUTO: 60 % (ref 14–44)
MAGNESIUM SERPL-MCNC: 1.9 MG/DL (ref 1.9–2.7)
MCH RBC QN AUTO: 31.8 PG (ref 26.8–34.3)
MCHC RBC AUTO-ENTMCNC: 34.3 G/DL (ref 31.4–37.4)
MCV RBC AUTO: 93 FL (ref 82–98)
MONOCYTES # BLD AUTO: 0.33 THOUSAND/ÂΜL (ref 0.17–1.22)
MONOCYTES NFR BLD AUTO: 16 % (ref 4–12)
NEUTROPHILS # BLD AUTO: 0.2 THOUSANDS/ÂΜL (ref 1.85–7.62)
NEUTS SEG NFR BLD AUTO: 9 % (ref 43–75)
NRBC BLD AUTO-RTO: 0 /100 WBCS
PLATELET # BLD AUTO: 125 THOUSANDS/UL (ref 149–390)
PMV BLD AUTO: 9.5 FL (ref 8.9–12.7)
POTASSIUM SERPL-SCNC: 3.8 MMOL/L (ref 3.5–5.3)
PROCALCITONIN SERPL-MCNC: 0.29 NG/ML
RBC # BLD AUTO: 3.4 MILLION/UL (ref 3.88–5.62)
SODIUM SERPL-SCNC: 140 MMOL/L (ref 135–147)
WBC # BLD AUTO: 2.12 THOUSAND/UL (ref 4.31–10.16)

## 2024-04-07 PROCEDURE — 84145 PROCALCITONIN (PCT): CPT | Performed by: STUDENT IN AN ORGANIZED HEALTH CARE EDUCATION/TRAINING PROGRAM

## 2024-04-07 PROCEDURE — 83735 ASSAY OF MAGNESIUM: CPT | Performed by: STUDENT IN AN ORGANIZED HEALTH CARE EDUCATION/TRAINING PROGRAM

## 2024-04-07 PROCEDURE — 94760 N-INVAS EAR/PLS OXIMETRY 1: CPT

## 2024-04-07 PROCEDURE — 80048 BASIC METABOLIC PNL TOTAL CA: CPT | Performed by: STUDENT IN AN ORGANIZED HEALTH CARE EDUCATION/TRAINING PROGRAM

## 2024-04-07 PROCEDURE — 94660 CPAP INITIATION&MGMT: CPT

## 2024-04-07 PROCEDURE — 99233 SBSQ HOSP IP/OBS HIGH 50: CPT | Performed by: STUDENT IN AN ORGANIZED HEALTH CARE EDUCATION/TRAINING PROGRAM

## 2024-04-07 PROCEDURE — 85025 COMPLETE CBC W/AUTO DIFF WBC: CPT | Performed by: STUDENT IN AN ORGANIZED HEALTH CARE EDUCATION/TRAINING PROGRAM

## 2024-04-07 RX ORDER — HYDROXYZINE HYDROCHLORIDE 25 MG/1
25 TABLET, FILM COATED ORAL EVERY 6 HOURS PRN
Status: DISCONTINUED | OUTPATIENT
Start: 2024-04-07 | End: 2024-04-10 | Stop reason: HOSPADM

## 2024-04-07 RX ORDER — ACETAMINOPHEN 325 MG/1
975 TABLET ORAL EVERY 8 HOURS PRN
Status: DISCONTINUED | OUTPATIENT
Start: 2024-04-07 | End: 2024-04-10 | Stop reason: HOSPADM

## 2024-04-07 RX ORDER — ACETAMINOPHEN 325 MG/1
650 TABLET ORAL EVERY 6 HOURS
Status: DISCONTINUED | OUTPATIENT
Start: 2024-04-07 | End: 2024-04-07

## 2024-04-07 RX ORDER — DULOXETIN HYDROCHLORIDE 60 MG/1
60 CAPSULE, DELAYED RELEASE ORAL 2 TIMES DAILY
Status: DISCONTINUED | OUTPATIENT
Start: 2024-04-07 | End: 2024-04-10 | Stop reason: HOSPADM

## 2024-04-07 RX ORDER — KETOROLAC TROMETHAMINE 30 MG/ML
15 INJECTION, SOLUTION INTRAMUSCULAR; INTRAVENOUS ONCE
Status: COMPLETED | OUTPATIENT
Start: 2024-04-07 | End: 2024-04-07

## 2024-04-07 RX ADMIN — ACETAMINOPHEN 975 MG: 325 TABLET, FILM COATED ORAL at 23:54

## 2024-04-07 RX ADMIN — GUAIFENESIN AND DEXTROMETHORPHAN 10 ML: 100; 10 SYRUP ORAL at 07:49

## 2024-04-07 RX ADMIN — HYDROXYZINE HYDROCHLORIDE 25 MG: 25 TABLET ORAL at 14:08

## 2024-04-07 RX ADMIN — CHLORDIAZEPOXIDE HYDROCHLORIDE 10 MG: 10 CAPSULE ORAL at 07:50

## 2024-04-07 RX ADMIN — BUPRENORPHINE 4 MG: 2 TABLET SUBLINGUAL at 06:27

## 2024-04-07 RX ADMIN — GUAIFENESIN AND DEXTROMETHORPHAN 10 ML: 100; 10 SYRUP ORAL at 21:06

## 2024-04-07 RX ADMIN — LORAZEPAM 0.5 MG: 2 INJECTION INTRAMUSCULAR; INTRAVENOUS at 07:49

## 2024-04-07 RX ADMIN — LEVETIRACETAM 500 MG: 500 TABLET, FILM COATED ORAL at 07:50

## 2024-04-07 RX ADMIN — BUPRENORPHINE 4 MG: 2 TABLET SUBLINGUAL at 00:08

## 2024-04-07 RX ADMIN — ENOXAPARIN SODIUM 40 MG: 40 INJECTION SUBCUTANEOUS at 21:06

## 2024-04-07 RX ADMIN — ACETAMINOPHEN 650 MG: 325 TABLET, FILM COATED ORAL at 11:31

## 2024-04-07 RX ADMIN — HYDROXYZINE HYDROCHLORIDE 25 MG: 25 TABLET ORAL at 21:06

## 2024-04-07 RX ADMIN — KETOROLAC TROMETHAMINE 15 MG: 30 INJECTION, SOLUTION INTRAMUSCULAR; INTRAVENOUS at 21:56

## 2024-04-07 RX ADMIN — PREGABALIN 100 MG: 100 CAPSULE ORAL at 07:50

## 2024-04-07 RX ADMIN — DULOXETINE HYDROCHLORIDE 60 MG: 60 CAPSULE, DELAYED RELEASE ORAL at 07:50

## 2024-04-07 RX ADMIN — CEFTRIAXONE SODIUM 2000 MG: 2 INJECTION, POWDER, FOR SOLUTION INTRAMUSCULAR; INTRAVENOUS at 07:49

## 2024-04-07 RX ADMIN — AZITHROMYCIN MONOHYDRATE 500 MG: 500 INJECTION, POWDER, LYOPHILIZED, FOR SOLUTION INTRAVENOUS at 08:55

## 2024-04-07 RX ADMIN — DULOXETINE HYDROCHLORIDE 60 MG: 60 CAPSULE, DELAYED RELEASE ORAL at 16:51

## 2024-04-07 RX ADMIN — CHLORDIAZEPOXIDE HYDROCHLORIDE 10 MG: 10 CAPSULE ORAL at 00:08

## 2024-04-07 RX ADMIN — ACETAMINOPHEN 650 MG: 325 TABLET, FILM COATED ORAL at 16:51

## 2024-04-07 RX ADMIN — BUPRENORPHINE 4 MG: 2 TABLET SUBLINGUAL at 16:51

## 2024-04-07 RX ADMIN — LEVETIRACETAM 500 MG: 500 TABLET, FILM COATED ORAL at 21:06

## 2024-04-07 RX ADMIN — PREGABALIN 100 MG: 100 CAPSULE ORAL at 16:51

## 2024-04-07 RX ADMIN — CHLORDIAZEPOXIDE HYDROCHLORIDE 10 MG: 10 CAPSULE ORAL at 16:51

## 2024-04-07 RX ADMIN — BUPRENORPHINE 4 MG: 2 TABLET SUBLINGUAL at 23:58

## 2024-04-07 RX ADMIN — BUPRENORPHINE 4 MG: 2 TABLET SUBLINGUAL at 11:31

## 2024-04-07 RX ADMIN — ACETAMINOPHEN 650 MG: 325 TABLET, FILM COATED ORAL at 06:27

## 2024-04-07 NOTE — ASSESSMENT & PLAN NOTE
Presenting with concerns of PNA with sob and hypoxia  , febrile to 101.9F, hypoxic to low 80s on room air now requiring 4L NC, WBC 3.4  Received 1 liter NS in the ED   Started on azithro/ctx for possible PNA (DAY 4)   Sepsis power plan  Follow up blood and urine cultures

## 2024-04-07 NOTE — QUICK NOTE
Patient having increased complaints of anxiety.  IV Ativan was added for agitation.  Will further extend for anxiety hold for lethargy.

## 2024-04-07 NOTE — ASSESSMENT & PLAN NOTE
Presenting from drug/alcohol rehab facility with SOB and hypoxia requiring 4L NC   He reported this was ongoing for the last several days but worsened today and he developed some confusion  Confusion started a few days prior to admission while he was drug/alcohol rehab   Covid/rvp negative   Started on azithro/ctx in the ED - continue (DAY 4)   Sending urinary antigens - negative   Procal - elevated

## 2024-04-07 NOTE — ASSESSMENT & PLAN NOTE
History of alcohol and opioid use disorder  He had history of oxycodone abuse and valium abuse   He was at a detox facility for the past 11 days  Currently on Subutex 4 mg 4 times daily and Librium 10 mg 3 times daily  ED spoke with provider at [facility] and reordered his withdrawal medications (as above)  Continue subutex and librium   - Family and patient requesting return to drug/alchol rehab on discharge   - Discussed with toxicology and they did not recommend any further short-acting benzos at this time and weaning librium  - Discussed with family   - continue librium, will dc PRN ativan (to avoid further benzos)   - Patient and family hope for eventual discharge back to drug/alcohol rehab when medically cleared. I did discuss with provider at [facility] and they said they would take him back when medically cleared.

## 2024-04-07 NOTE — RESPIRATORY THERAPY NOTE
04/06/24 2239   Respiratory Assessment   Resp Comments Pt refusing cpap at this time   Non-Invasive Information   SpO2 90 %     RT Ventilator Management Note  Lalito Cramer 63 y.o. male MRN: [id number]  Unit/Bed#: -01 Encounter: [id number]      Daily Screen    No data found in the last 10 encounters.           Physical Exam:          Resp Comments: Pt refusing cpap at this time

## 2024-04-07 NOTE — PLAN OF CARE
Problem: Potential for Falls  Goal: Patient will remain free of falls  Description: INTERVENTIONS:  - Educate patient/family on patient safety including physical limitations  - Instruct patient to call for assistance with activity   - Consult OT/PT to assist with strengthening/mobility   - Keep Call bell within reach  - Keep bed low and locked with side rails adjusted as appropriate  - Keep care items and personal belongings within reach  - Initiate and maintain comfort rounds  - Make Fall Risk Sign visible to staff  - Offer Toileting every 2 Hours, in advance of need  - Initiate/Maintain bed alarm  - Obtain necessary fall risk management equipment: chair alarm  - Apply yellow socks and bracelet for high fall risk patients  - Consider moving patient to room near nurses station  Outcome: Progressing     Problem: Prexisting or High Potential for Compromised Skin Integrity  Goal: Skin integrity is maintained or improved  Description: INTERVENTIONS:  - Identify patients at risk for skin breakdown  - Assess and monitor skin integrity  - Assess and monitor nutrition and hydration status  - Monitor labs   - Assess for incontinence   - Turn and reposition patient  - Assist with mobility/ambulation  - Relieve pressure over bony prominences  - Avoid friction and shearing  - Provide appropriate hygiene as needed including keeping skin clean and dry  - Evaluate need for skin moisturizer/barrier cream  - Collaborate with interdisciplinary team   - Patient/family teaching  - Consider wound care consult   Outcome: Progressing     Problem: PAIN - ADULT  Goal: Verbalizes/displays adequate comfort level or baseline comfort level  Description: Interventions:  - Encourage patient to monitor pain and request assistance  - Assess pain using appropriate pain scale  - Administer analgesics based on type and severity of pain and evaluate response  - Implement non-pharmacological measures as appropriate and evaluate response  - Consider  cultural and social influences on pain and pain management  - Notify physician/advanced practitioner if interventions unsuccessful or patient reports new pain  Outcome: Progressing     Problem: INFECTION - ADULT  Goal: Absence or prevention of progression during hospitalization  Description: INTERVENTIONS:  - Assess and monitor for signs and symptoms of infection  - Monitor lab/diagnostic results  - Monitor all insertion sites, i.e. indwelling lines, tubes, and drains  - Monitor endotracheal if appropriate and nasal secretions for changes in amount and color  - [place] appropriate cooling/warming therapies per order  - Administer medications as ordered  - Instruct and encourage patient and family to use good hand hygiene technique  - Identify and instruct in appropriate isolation precautions for identified infection/condition  Outcome: Progressing  Goal: Absence of fever/infection during neutropenic period  Description: INTERVENTIONS:  - Monitor WBC    Outcome: Progressing     Problem: SAFETY ADULT  Goal: Patient will remain free of falls  Description: INTERVENTIONS:  - Educate patient/family on patient safety including physical limitations  - Instruct patient to call for assistance with activity   - Consult OT/PT to assist with strengthening/mobility   - Keep Call bell within reach  - Keep bed low and locked with side rails adjusted as appropriate  - Keep care items and personal belongings within reach  - Initiate and maintain comfort rounds  - Make Fall Risk Sign visible to staff  - Offer Toileting every 2 Hours, in advance of need  - Initiate/Maintain bed alarm  - Obtain necessary fall risk management equipment: chair larm  - Apply yellow socks and bracelet for high fall risk patients  - Consider moving patient to room near nurses station  Outcome: Progressing  Goal: Maintain or return to baseline ADL function  Description: INTERVENTIONS:  -  Assess patient's ability to carry out ADLs; assess patient's baseline  for ADL function and identify physical deficits which impact ability to perform ADLs (bathing, care of mouth/teeth, toileting, grooming, dressing, etc.)  - Assess/evaluate cause of self-care deficits   - Assess range of motion  - Assess patient's mobility; develop plan if impaired  - Assess patient's need for assistive devices and provide as appropriate  - Encourage maximum independence but intervene and supervise when necessary  - Involve family in performance of ADLs  - Assess for home care needs following discharge   - Consider OT consult to assist with ADL evaluation and planning for discharge  - Provide patient education as appropriate  Outcome: Progressing  Goal: Maintains/Returns to pre admission functional level  Description: INTERVENTIONS:  - Perform AM-PAC 6 Click Basic Mobility/ Daily Activity assessment daily.  - Set and communicate daily mobility goal to care team and patient/family/caregiver.   - Collaborate with rehabilitation services on mobility goals if consulted  - Perform Range of Motion 3 times a day.  - Reposition patient every 3 hours.  - Dangle patient 3 times a day  - Stand patient 3 times a day  - Ambulate patient 3 times a day  - Out of bed to chair 3 times a day   - Out of bed for meals 3 times a day  - Out of bed for toileting  - Record patient progress and toleration of activity level   Outcome: Progressing     Problem: DISCHARGE PLANNING  Goal: Discharge to home or other facility with appropriate resources  Description: INTERVENTIONS:  - Identify barriers to discharge w/patient and caregiver  - Arrange for needed discharge resources and transportation as appropriate  - Identify discharge learning needs (meds, wound care, etc.)  - Arrange for interpretive services to assist at discharge as needed  - Refer to Case Management Department for coordinating discharge planning if the patient needs post-hospital services based on physician/advanced practitioner order or complex needs related to  functional status, cognitive ability, or social support system  Outcome: Progressing     Problem: Knowledge Deficit  Goal: Patient/family/caregiver demonstrates understanding of disease process, treatment plan, medications, and discharge instructions  Description: Complete learning assessment and assess knowledge base.  Interventions:  - Provide teaching at level of understanding  - Provide teaching via preferred learning methods  Outcome: Progressing     Problem: METABOLIC, FLUID AND ELECTROLYTES - ADULT  Goal: Electrolytes maintained within normal limits  Description: INTERVENTIONS:  - Monitor labs and assess patient for signs and symptoms of electrolyte imbalances  - Administer electrolyte replacement as ordered  - Monitor response to electrolyte replacements, including repeat lab results as appropriate  - Instruct patient on fluid and nutrition as appropriate  Outcome: Progressing  Goal: Fluid balance maintained  Description: INTERVENTIONS:  - Monitor labs   - Monitor I/O and WT  - Instruct patient on fluid and nutrition as appropriate  - Assess for signs & symptoms of volume excess or deficit  Outcome: Progressing  Goal: Glucose maintained within target range  Description: INTERVENTIONS:  - Monitor Blood Glucose as ordered  - Assess for signs and symptoms of hyperglycemia and hypoglycemia  - Administer ordered medications to maintain glucose within target range  - Assess nutritional intake and initiate nutrition service referral as needed  Outcome: Progressing

## 2024-04-07 NOTE — ASSESSMENT & PLAN NOTE
Continue Lyrica and duloxetine 60 BID   Added Toradol for moderate to severe pain  Avoid narcotics given history of abuse unless absolutely necessary.

## 2024-04-07 NOTE — PROGRESS NOTES
[facility]  Progress Note  Name: Lalito Cramer I  MRN: [id number]  Unit/Bed#: -01 I Date of Admission: 4/4/2024   Date of Service: 4/7/2024 I Hospital Day: 3    Assessment/Plan   * Sepsis (HCC)  Assessment & Plan  Presenting with concerns of PNA with sob and hypoxia  , febrile to 101.9F, hypoxic to low 80s on room air now requiring 4L NC, WBC 3.4  Received 1 liter NS in the ED   Started on azithro/ctx for possible PNA (DAY 4)   Sepsis power plan  Follow up blood and urine cultures      Pneumonia  Assessment & Plan  Presenting from drug/alcohol rehab facility with SOB and hypoxia requiring 4L NC   He reported this was ongoing for the last several days but worsened today and he developed some confusion  Confusion started a few days prior to admission while he was drug/alcohol rehab   Covid/rvp negative   Started on azithro/ctx in the ED - continue (DAY 4)   Sending urinary antigens - negative   Procal - elevated       Polysubstance abuse (HCC)  Assessment & Plan  History of alcohol and opioid use disorder  He had history of oxycodone abuse and valium abuse   He was at a detox facility for the past 11 days  Currently on Subutex 4 mg 4 times daily and Librium 10 mg 3 times daily  ED spoke with provider at [place] and reordered his withdrawal medications (as above)  Continue subutex and librium   - Family and patient requesting return to drug/alchol rehab on discharge   - Discussed with toxicology and they did not recommend any further short-acting benzos at this time and weaning librium  - Discussed with family   - continue librium, will dc PRN ativan (to avoid further benzos)   - Patient and family hope for eventual discharge back to drug/alcohol rehab when medically cleared. I did discuss with provider at [place] and they said they would take him back when medically cleared.     Chronic pain  Assessment & Plan  Continue Lyrica and duloxetine 60 BID   Added Toradol for  moderate to severe pain  Avoid narcotics given history of abuse unless absolutely necessary.    Hyperlipidemia  Assessment & Plan  Continue statin    Seizures (HCC)  Assessment & Plan  Continue keppra   History is unclear to the patient or family whether he actually has a history of seizures.  However was found to be on medication list so we will continue for now.               VTE Pharmacologic Prophylaxis:   Moderate Risk (Score 3-4) - Pharmacological DVT Prophylaxis Ordered: enoxaparin (Lovenox).    Mobility:   Basic Mobility Inpatient Raw Score: 22  JH-HLM Goal: 7: Walk 25 feet or more  JH-HLM Achieved: 7: Walk 25 feet or more  JH-HLM Goal achieved. Continue to encourage appropriate mobility.    Patient Centered Rounds: I performed bedside rounds with nursing staff today.   Discussions with Specialists or Other Care Team Provider: none    Education and Discussions with Family / Patient: Updated  (son) at bedside.    Total Time Spent on Date of Encounter in care of patient: 55 mins. This time was spent on one or more of the following: performing physical exam; counseling and coordination of care; obtaining or reviewing history; documenting in the medical record; reviewing/ordering tests, medications or procedures; communicating with other healthcare professionals and discussing with patient's family/caregivers.    Current Length of Stay: 3 day(s)  Current Patient Status: Inpatient   Certification Statement: The patient will continue to require additional inpatient hospital stay due to evaluation of mental status changes  Discharge Plan: Anticipate discharge in 24-48 hrs to [facility] drug/alcohol rehab    Code Status: Level 1 - Full Code    Subjective:   Still having intermittent mental status changes. This morning he can tell me he is in the hospital and understands he is being treated for pneumonia. No further o2 requirement. He has been intermittently calling his family members confused I.e. his  son and wife.     Objective:     Vitals:   Temp (24hrs), Av.5 °F (36.9 °C), Min:98 °F (36.7 °C), Max:99.7 °F (37.6 °C)    Temp:  [98 °F (36.7 °C)-99.7 °F (37.6 °C)] 98 °F (36.7 °C)  HR:  [60-71] 70  Resp:  [15-17] 17  BP: (120-199)/(69-96) 120/69  SpO2:  [88 %-99 %] 95 %  Body mass index is 29.53 kg/m².     Input and Output Summary (last 24 hours):     Intake/Output Summary (Last 24 hours) at 2024 1608  Last data filed at 2024 0855  Gross per 24 hour   Intake 1320 ml   Output 400 ml   Net 920 ml       Physical Exam:   Physical Exam   NAD   Nonlabored resp  RRR  NTND abd   Aaox2 (can't tell me name of the hospital)     Additional Data:     Labs:  Results from last 7 days   Lab Units 24  0500   WBC Thousand/uL 2.12*   HEMOGLOBIN g/dL 10.8*   HEMATOCRIT % 31.5*   PLATELETS Thousands/uL 125*   NEUTROS PCT % 9*   LYMPHS PCT % 60*   MONOS PCT % 16*   EOS PCT % 14*     Results from last 7 days   Lab Units 24  0500 24  0601 24  0447   SODIUM mmol/L 140   < > 142   POTASSIUM mmol/L 3.8   < > 3.9   CHLORIDE mmol/L 110*   < > 108   CO2 mmol/L 28   < > 29   BUN mg/dL 22   < > 32*   CREATININE mg/dL 0.58*   < > 0.74   ANION GAP mmol/L 2*   < > 5   CALCIUM mg/dL 8.1*   < > 7.9*   ALBUMIN g/dL  --   --  3.3*   TOTAL BILIRUBIN mg/dL  --   --  1.05*   ALK PHOS U/L  --   --  79   ALT U/L  --   --  31   AST U/L  --   --  32   GLUCOSE RANDOM mg/dL 106   < > 87    < > = values in this interval not displayed.     Results from last 7 days   Lab Units 24  0751   INR  0.96             Results from last 7 days   Lab Units 24  0500 24  0601 24  0447 24  0751   LACTIC ACID mmol/L  --   --   --  1.2   PROCALCITONIN ng/ml 0.29* 0.54* 0.92* 0.14       Lines/Drains:  Invasive Devices       Peripheral Intravenous Line  Duration             Peripheral IV 24 Left Antecubital 3 days    Peripheral IV 24 Right Antecubital 3 days                          Imaging: Reviewed  radiology reports from this admission including: chest xray    Recent Cultures (last 7 days):   Results from last 7 days   Lab Units 04/04/24  1515 04/04/24  0821 04/04/24  0812   BLOOD CULTURE   --  No Growth at 72 hrs. No Growth at 72 hrs.   LEGIONELLA URINARY ANTIGEN  Negative  --   --        Last 24 Hours Medication List:   Current Facility-Administered Medications   Medication Dose Route Frequency Provider Last Rate    acetaminophen  650 mg Oral Q6H Blake Neri MD      albuterol  2.5 mg Nebulization Q6H PRN Blake Neri MD      azithromycin  500 mg Intravenous Q24H Blake Neri  mg (04/07/24 0855)    buprenorphine  4 mg Sublingual 4x Daily Korey Cam MD      cefTRIAXone  2,000 mg Intravenous Q24H Blake Neri MD 2,000 mg (04/07/24 0749)    chlordiazePOXIDE  10 mg Oral Q8H [facility] Korey Cam MD      dextromethorphan-guaiFENesin  10 mL Oral Q4H PRN Blake Neri MD      DULoxetine  60 mg Oral BID Blake Neri MD      enoxaparin  40 mg Subcutaneous Daily Blake Neri MD      hydrOXYzine HCL  25 mg Oral Q6H PRN Blake Neri MD      levETIRAcetam  500 mg Oral Q12H [facility] Blake Neri MD      melatonin  3 mg Oral HS PRN JAMES Mckeon      polyethylene glycol  17 g Oral Daily Blake Neri MD      pregabalin  100 mg Oral BID Blake Neri MD          Today, Patient Was Seen By: Balke Neri MD    **Please Note: This note may have been constructed using a voice recognition system.**

## 2024-04-08 LAB
ALBUMIN SERPL BCP-MCNC: 3.6 G/DL (ref 3.5–5)
ALP SERPL-CCNC: 82 U/L (ref 34–104)
ALT SERPL W P-5'-P-CCNC: 41 U/L (ref 7–52)
ANION GAP SERPL CALCULATED.3IONS-SCNC: 4 MMOL/L (ref 4–13)
AST SERPL W P-5'-P-CCNC: 41 U/L (ref 13–39)
BASOPHILS # BLD AUTO: 0.03 THOUSANDS/ÂΜL (ref 0–0.1)
BASOPHILS NFR BLD AUTO: 1 % (ref 0–1)
BILIRUB SERPL-MCNC: 0.61 MG/DL (ref 0.2–1)
BUN SERPL-MCNC: 17 MG/DL (ref 5–25)
CALCIUM SERPL-MCNC: 8.3 MG/DL (ref 8.4–10.2)
CHLORIDE SERPL-SCNC: 110 MMOL/L (ref 96–108)
CO2 SERPL-SCNC: 26 MMOL/L (ref 21–32)
CREAT SERPL-MCNC: 0.66 MG/DL (ref 0.6–1.3)
EOSINOPHIL # BLD AUTO: 0.36 THOUSAND/ÂΜL (ref 0–0.61)
EOSINOPHIL NFR BLD AUTO: 15 % (ref 0–6)
ERYTHROCYTE [DISTWIDTH] IN BLOOD BY AUTOMATED COUNT: 12.6 % (ref 11.6–15.1)
GFR SERPL CREATININE-BSD FRML MDRD: 102 ML/MIN/1.73SQ M
GLUCOSE SERPL-MCNC: 92 MG/DL (ref 65–140)
HCT VFR BLD AUTO: 31.4 % (ref 36.5–49.3)
HGB BLD-MCNC: 10.8 G/DL (ref 12–17)
IMM GRANULOCYTES # BLD AUTO: 0 THOUSAND/UL (ref 0–0.2)
IMM GRANULOCYTES NFR BLD AUTO: 0 % (ref 0–2)
LYMPHOCYTES # BLD AUTO: 1.41 THOUSANDS/ÂΜL (ref 0.6–4.47)
LYMPHOCYTES NFR BLD AUTO: 60 % (ref 14–44)
MAGNESIUM SERPL-MCNC: 1.9 MG/DL (ref 1.9–2.7)
MCH RBC QN AUTO: 31.6 PG (ref 26.8–34.3)
MCHC RBC AUTO-ENTMCNC: 34.4 G/DL (ref 31.4–37.4)
MCV RBC AUTO: 92 FL (ref 82–98)
MONOCYTES # BLD AUTO: 0.35 THOUSAND/ÂΜL (ref 0.17–1.22)
MONOCYTES NFR BLD AUTO: 15 % (ref 4–12)
NEUTROPHILS # BLD AUTO: 0.2 THOUSANDS/ÂΜL (ref 1.85–7.62)
NEUTS SEG NFR BLD AUTO: 9 % (ref 43–75)
NRBC BLD AUTO-RTO: 0 /100 WBCS
PLATELET # BLD AUTO: 121 THOUSANDS/UL (ref 149–390)
PMV BLD AUTO: 9.4 FL (ref 8.9–12.7)
POTASSIUM SERPL-SCNC: 3.9 MMOL/L (ref 3.5–5.3)
PROCALCITONIN SERPL-MCNC: 0.25 NG/ML
PROT SERPL-MCNC: 5.8 G/DL (ref 6.4–8.4)
RBC # BLD AUTO: 3.42 MILLION/UL (ref 3.88–5.62)
SODIUM SERPL-SCNC: 140 MMOL/L (ref 135–147)
WBC # BLD AUTO: 2.35 THOUSAND/UL (ref 4.31–10.16)

## 2024-04-08 PROCEDURE — 82306 VITAMIN D 25 HYDROXY: CPT

## 2024-04-08 PROCEDURE — 85025 COMPLETE CBC W/AUTO DIFF WBC: CPT | Performed by: STUDENT IN AN ORGANIZED HEALTH CARE EDUCATION/TRAINING PROGRAM

## 2024-04-08 PROCEDURE — 84145 PROCALCITONIN (PCT): CPT | Performed by: STUDENT IN AN ORGANIZED HEALTH CARE EDUCATION/TRAINING PROGRAM

## 2024-04-08 PROCEDURE — 80053 COMPREHEN METABOLIC PANEL: CPT | Performed by: STUDENT IN AN ORGANIZED HEALTH CARE EDUCATION/TRAINING PROGRAM

## 2024-04-08 PROCEDURE — 83735 ASSAY OF MAGNESIUM: CPT | Performed by: STUDENT IN AN ORGANIZED HEALTH CARE EDUCATION/TRAINING PROGRAM

## 2024-04-08 PROCEDURE — 97166 OT EVAL MOD COMPLEX 45 MIN: CPT

## 2024-04-08 PROCEDURE — 99232 SBSQ HOSP IP/OBS MODERATE 35: CPT | Performed by: STUDENT IN AN ORGANIZED HEALTH CARE EDUCATION/TRAINING PROGRAM

## 2024-04-08 PROCEDURE — 84443 ASSAY THYROID STIM HORMONE: CPT

## 2024-04-08 RX ORDER — KETOROLAC TROMETHAMINE 30 MG/ML
15 INJECTION, SOLUTION INTRAMUSCULAR; INTRAVENOUS ONCE
Status: COMPLETED | OUTPATIENT
Start: 2024-04-08 | End: 2024-04-08

## 2024-04-08 RX ADMIN — PREGABALIN 100 MG: 100 CAPSULE ORAL at 08:40

## 2024-04-08 RX ADMIN — BUPRENORPHINE 4 MG: 2 TABLET SUBLINGUAL at 18:28

## 2024-04-08 RX ADMIN — BUPRENORPHINE 4 MG: 2 TABLET SUBLINGUAL at 13:27

## 2024-04-08 RX ADMIN — PREGABALIN 100 MG: 100 CAPSULE ORAL at 18:28

## 2024-04-08 RX ADMIN — CHLORDIAZEPOXIDE HYDROCHLORIDE 10 MG: 10 CAPSULE ORAL at 00:16

## 2024-04-08 RX ADMIN — CHLORDIAZEPOXIDE HYDROCHLORIDE 10 MG: 10 CAPSULE ORAL at 08:40

## 2024-04-08 RX ADMIN — DULOXETINE HYDROCHLORIDE 60 MG: 60 CAPSULE, DELAYED RELEASE ORAL at 18:28

## 2024-04-08 RX ADMIN — LEVETIRACETAM 500 MG: 500 TABLET, FILM COATED ORAL at 20:13

## 2024-04-08 RX ADMIN — AZITHROMYCIN MONOHYDRATE 500 MG: 500 INJECTION, POWDER, LYOPHILIZED, FOR SOLUTION INTRAVENOUS at 09:57

## 2024-04-08 RX ADMIN — CEFTRIAXONE SODIUM 2000 MG: 2 INJECTION, POWDER, FOR SOLUTION INTRAMUSCULAR; INTRAVENOUS at 08:45

## 2024-04-08 RX ADMIN — DULOXETINE HYDROCHLORIDE 60 MG: 60 CAPSULE, DELAYED RELEASE ORAL at 08:40

## 2024-04-08 RX ADMIN — ACETAMINOPHEN 975 MG: 325 TABLET, FILM COATED ORAL at 20:13

## 2024-04-08 RX ADMIN — BUPRENORPHINE 4 MG: 2 TABLET SUBLINGUAL at 05:54

## 2024-04-08 RX ADMIN — HYDROXYZINE HYDROCHLORIDE 25 MG: 25 TABLET ORAL at 08:40

## 2024-04-08 RX ADMIN — HYDROXYZINE HYDROCHLORIDE 25 MG: 25 TABLET ORAL at 14:51

## 2024-04-08 RX ADMIN — LEVETIRACETAM 500 MG: 500 TABLET, FILM COATED ORAL at 08:40

## 2024-04-08 RX ADMIN — ENOXAPARIN SODIUM 40 MG: 40 INJECTION SUBCUTANEOUS at 20:13

## 2024-04-08 RX ADMIN — KETOROLAC TROMETHAMINE 15 MG: 30 INJECTION, SOLUTION INTRAMUSCULAR; INTRAVENOUS at 05:54

## 2024-04-08 NOTE — ASSESSMENT & PLAN NOTE
Presenting with concerns of PNA with sob and hypoxia  Being treated for pneumonia  Infectious work up negative   Completed course of antibiotics on 4/8  Sepsis resolved

## 2024-04-08 NOTE — PLAN OF CARE
Problem: METABOLIC, FLUID AND ELECTROLYTES - ADULT  Goal: Electrolytes maintained within normal limits  Description: INTERVENTIONS:  - Monitor labs and assess patient for signs and symptoms of electrolyte imbalances  - Administer electrolyte replacement as ordered  - Monitor response to electrolyte replacements, including repeat lab results as appropriate  - Instruct patient on fluid and nutrition as appropriate  Outcome: Progressing     Problem: METABOLIC, FLUID AND ELECTROLYTES - ADULT  Goal: Fluid balance maintained  Description: INTERVENTIONS:  - Monitor labs   - Monitor I/O and WT  - Instruct patient on fluid and nutrition as appropriate  - Assess for signs & symptoms of volume excess or deficit  Outcome: Progressing     Problem: Knowledge Deficit  Goal: Patient/family/caregiver demonstrates understanding of disease process, treatment plan, medications, and discharge instructions  Description: Complete learning assessment and assess knowledge base.  Interventions:  - Provide teaching at level of understanding  - Provide teaching via preferred learning methods  Outcome: Progressing

## 2024-04-08 NOTE — QUICK NOTE
Notified by RN, patient with ongoing rib pain minimally relieved with scheduled tylenol 650mg. Will increase Tylenol to 975mg Q8hrs. Apply Aqua K to affected area. Ordered 1x dose toradol for break through pain. Continue with supportive care.

## 2024-04-08 NOTE — ASSESSMENT & PLAN NOTE
History of alcohol and opioid use disorder  He had history of oxycodone abuse and valium abuse   He was at a detox facility for the past 11 days  Currently on Subutex 4 mg 4 times daily and Librium 10 mg 3 times daily  ED spoke with provider at [facility] and reordered his withdrawal medications (as above)  Continue subutex and librium   - Family and patient requesting return to drug/alchol rehab on discharge   - Discussed with toxicology and they did not recommend any further short-acting benzos at this time and weaning librium  - Discussed with family   - continue librium, will dc PRN ativan (to avoid further benzos)   - Patient and family hope for eventual discharge back to drug/alcohol rehab when medically cleared.  - Follow up psychiatry consult  - At this time, low suspicion for withdrawal, no indication to continue librium TID, discontinue on 4/8

## 2024-04-08 NOTE — OCCUPATIONAL THERAPY NOTE
Occupational Therapy Evaluation      Lalito Cramer    4/8/2024    Principal Problem:    Sepsis (HCC)  Active Problems:    Pneumonia    Polysubstance abuse (HCC)    Seizures (HCC)    Hyperlipidemia    Chronic pain      History reviewed. No pertinent past medical history.    History reviewed. No pertinent surgical history.    04/08/24 0939   OT Last Visit   OT Visit Date 04/08/24   Note Type   Note type Evaluation   Pain Assessment   Pain Assessment Tool 0-10   Pain Score 7   Pain Location/Orientation Orientation: Right;Location: Rib Cage   Restrictions/Precautions   Other Precautions Chair Alarm;Bed Alarm;O2;Fall Risk;Pain   Home Living   Type of Home House   Home Layout Two level;Bed/bath upstairs;1/2 bath on main level;Stairs to enter without rails  (2STE)   Bathroom Shower/Tub Walk-in shower   Bathroom Toilet Standard   Bathroom Equipment Grab bars in shower   Bathroom Accessibility Accessible   Home Equipment Walker;Cane;Wheelchair-manual   Additional Comments no dme at baseline   Prior Function   Level of [place] Independent with ADLs;Independent with functional mobility;Independent with IADLS   Lives With Spouse   Receives Help From Family   IADLs Independent with driving;Independent with medication management;Independent with meal prep  (meal prep, cleaning, and laundry shared with wife)   Falls in the last 6 months 0   Vocational Retired   Comments 163/91; 96%; HR 76   Lifestyle   Autonomy Pt reports PLOF was Ind with ADLS, IADLs, and (+) driving   Reciprocal Relationships spouse   ADL   Eating Assistance 6  Modified independent   Eating Deficit Increased time to complete   Grooming Assistance 5  Supervision/Setup   Grooming Deficit Increased time to complete;Supervision/safety;Setup   UB Bathing Assistance 5  Supervision/Setup   UB Bathing Deficit Increased time to complete;Supervision/safety;Setup   LB Bathing Assistance 4  Minimal Assistance   LB Bathing Deficit Increased time to  complete;Supervision/safety;Verbal cueing;Steadying;Setup   UB Dressing Assistance 5  Supervision/Setup   UB Dressing Deficit Increased time to complete;Supervision/safety;Setup   LB Dressing Assistance 4  Minimal Assistance   LB Dressing Deficit Increased time to complete;Supervision/safety;Verbal cueing;Steadying;Setup   Toileting Assistance  5  Supervision/Setup   Toileting Deficit Increased time to complete;Supervison/safety;Setup   Functional Assistance 4  Minimal Assistance   Functional Deficit Increased time to complete;Supervision/safety;Setup   Bed Mobility   Supine to Sit 4  Minimal assistance   Additional items Assist x 1;Bedrails;Increased time required;Impulsive;Verbal cues   Transfers   Sit to Stand 4  Minimal assistance   Additional items Assist x 1;Increased time required;Verbal cues;Armrests;Impulsive   Stand to Sit 4  Minimal assistance   Additional items Assist x 1;Increased time required;Verbal cues;Armrests;Impulsive   Functional Mobility   Functional Mobility 4  Minimal assistance   Balance   Static Sitting Good   Dynamic Sitting Fair   Static Standing Fair   Dynamic Standing Fair -   Ambulatory Fair -   Activity Tolerance   Activity Tolerance Treatment limited secondary to agitation;Other (Comment)  (Pt reported he is having an anxiety attack. NSG reports he has been medicated)   Nurse Made Aware Spoke with YANA Leggett   RUE Assessment   RUE Assessment WFL   LUE Assessment   LUE Assessment WFL   Hand Function   Gross Motor Coordination Functional   Fine Motor Coordination Functional   Vision-Basic Assessment   Current Vision Wears glasses all the time   Vision - Complex Assessment   Ocular Range of Motion Intact   Psychosocial   Psychosocial (WDL) X   Patient Behaviors/Mood Anxious;Cooperative;Irritable;Labile   Needs Expressed Emotional   Cognition   Overall Cognitive Status Impaired   Arousal/Participation Alert;Cooperative;Uncooperative   Attention Attends with cues to redirect   Orientation  "Level Oriented to person;Oriented to place;Disoriented to time;Disoriented to situation   Memory Decreased recall of precautions;Decreased recall of recent events   Following Commands Follows one step commands without difficulty   Comments Pt reports feeling anxious and became agitated in the middle of the eval. Pt began shouting at therapist and stated \"Stop yelling at me. Stop yelling at me.\" over and over. OT spoke with pt's RN who stated she had given anxiety meds about 30-40 minutes prior. OT provided edu on pursed lipped breathing to A with anxiety. After a few minutes, pt reports he felt so much better.   Assessment   Limitation Decreased ADL status;Decreased high-level ADLs;Decreased self-care trans;Decreased endurance;Decreased Safe judgement during ADL   Prognosis Good   Assessment Pt is a 63 y.o. male seen for OT evaluation s/p admit to [facility] on 4/4/2024 w/ Sepsis (HCC). Comorbidities affecting pt's functional performance at time of assessment include:pneumonia, polysubstance abuse, and chronic pain . Orders placed for OT evaluation and treatment.  Performed at least two patient identifiers during session including name and wristband. Personal factors affecting pt at time of IE include:steps to enter environment, behavioral pattern, difficulty performing ADLS, difficulty performing IADLS , limited insight into deficits, compliance, decreased initiation and engagement , financial barriers, health management , and environment. Prior to admission, pt reports Ind with ADLs, Ind with IADLs, and (+) driving.  Upon evaluation: Pt requires S with UB ADLs, min A with LB ADLs, min A with xfers and min A with functional mobility 2* the following deficits impacting occupational performance: weakness, decreased dynamic sit/ stand balance, decreased activity tolerance, decreased standing tolerance time for self care and functional mobility, impaired problem solving, impulsivity, decreased safety awareness, " increased pain, impaired interpersonal skills, environmental deficits, decreased mobilty, and requiring external assistance to complete transitional movements. Pt to benefit from continued skilled OT tx while in the hospital to address deficits as defined above and maximize level of functional independence w ADL's and functional mobility. Occupational Performance areas to address include: bathing/shower, toilet hygiene, dressing, medication management, health maintenance, functional mobility, community mobility, clothing management, cleaning, meal prep, money management, and household maintenance. From OT standpoint, recommendation at time of d/c would be Level 3 (Min Resource Intensity).   Plan   Treatment Interventions ADL retraining;Functional transfer training;UE strengthening/ROM;Endurance training;Patient/family training;Equipment evaluation/education;Compensatory technique education;Continued evaluation;Cardiac education;Energy conservation;Activityengagement   Goal Expiration Date 04/21/24   OT Frequency 3-5x/wk   Discharge Recommendation   Rehab Resource Intensity Level, OT III (Minimum Resource Intensity)   AM-PAC Daily Activity Inpatient   Lower Body Dressing 3   Bathing 3   Toileting 3   Upper Body Dressing 3   Grooming 3   Eating 4   Daily Activity Raw Score 19   Daily Activity Standardized Score (Calc for Raw Score >=11) 40.22     Occupational therapy Goals: In 5-7 days    Patient will verbalize and demonstrate use of energy conservation/ deep breathing technique and work simplification skills during functional activity with no verbal cues.    Patient will verbalize and demonstrate good body mechanics and joint protection techniques during ADLs/ IADLs with no verbal cues     Patient will increase OOB/ sitting tolerance to 4-6 hours per day for increased participation in self care and leisure tasks with no s/s of exertion.    Patient will identify s/s of exertion during ADL and functional mobility with  no verbal cues.     Patient will verbalize/ demonstrate compensatory strategies to recover from exertion with no verbal cues.     Patient will increase standing tolerance time to 13-15 minutes with No UE support to complete sink level ADLs at modified independent level.    Patient will increase sitting tolerance at edge of bed to 30-45 minutes to complete UB ADLs at modified independent level.     Patient will demonstrate ability to safely perform LB ADLS with MI with long handled adaptive dressing equipment.    Patient/ Family will demonstrate competency with UE Home Exercise Program.       Jazmyn Li MS OTR/L

## 2024-04-08 NOTE — RESPIRATORY THERAPY NOTE
04/08/24 0400   Respiratory Assessment   Resp Comments Pt found off cpap   Non-Invasive Information   SpO2 93 %     RT Ventilator Management Note  Lalito Cramer 63 y.o. male MRN: [id number]  Unit/Bed#: -01 Encounter: [id number]      Daily Screen    No data found in the last 10 encounters.           Physical Exam:   Assessment Type: Assess only  General Appearance: Alert, Awake  Respiratory Pattern: Normal  Chest Assessment: Chest expansion symmetrical  Bilateral Breath Sounds: Clear  Cough: None  O2 Device: RA      Resp Comments: Pt found off cpap

## 2024-04-08 NOTE — PLAN OF CARE
Problem: OCCUPATIONAL THERAPY ADULT  Goal: Performs self-care activities at highest level of function for planned discharge setting.  See evaluation for individualized goals.  Description: Treatment Interventions: ADL retraining, Functional transfer training, UE strengthening/ROM, Endurance training, Patient/family training, Equipment evaluation/education, Compensatory technique education, Continued evaluation, Cardiac education, Energy conservation, Activityengagement          See flowsheet documentation for full assessment, interventions and recommendations.   4/8/2024 1235 by Jazmyn Li  Note: Limitation: Decreased ADL status, Decreased high-level ADLs, Decreased self-care trans, Decreased endurance, Decreased Safe judgement during ADL  Prognosis: Good  Assessment: Pt is a 63 y.o. male seen for OT evaluation s/p admit to [facility] on 4/4/2024 w/ Sepsis (HCC). Comorbidities affecting pt's functional performance at time of assessment include:pneumonia, polysubstance abuse, and chronic pain . Orders placed for OT evaluation and treatment.  Performed at least two patient identifiers during session including name and wristband. Personal factors affecting pt at time of IE include:steps to enter environment, behavioral pattern, difficulty performing ADLS, difficulty performing IADLS , limited insight into deficits, compliance, decreased initiation and engagement , financial barriers, health management , and environment. Prior to admission, pt reports Ind with ADLs, Ind with IADLs, and (+) driving.  Upon evaluation: Pt requires S with UB ADLs, min A with LB ADLs, min A with xfers and min A with functional mobility 2* the following deficits impacting occupational performance: weakness, decreased dynamic sit/ stand balance, decreased activity tolerance, decreased standing tolerance time for self care and functional mobility, impaired problem solving, impulsivity, decreased safety awareness, increased pain,  impaired interpersonal skills, environmental deficits, decreased mobilty, and requiring external assistance to complete transitional movements. Pt to benefit from continued skilled OT tx while in the hospital to address deficits as defined above and maximize level of functional independence w ADL's and functional mobility. Occupational Performance areas to address include: bathing/shower, toilet hygiene, dressing, medication management, health maintenance, functional mobility, community mobility, clothing management, cleaning, meal prep, money management, and household maintenance. From OT standpoint, recommendation at time of d/c would be Level 3 (Min Resource Intensity).     Rehab Resource Intensity Level, OT: III (Minimum Resource Intensity)       4/8/2024 1234 by Jazmyn Li  Note: Limitation: Decreased ADL status, Decreased high-level ADLs, Decreased self-care trans, Decreased endurance, Decreased Safe judgement during ADL  Prognosis: Good  Assessment: Pt is a 63 y.o. male seen for OT evaluation s/p admit to [facility] on 4/4/2024 w/ Sepsis (HCC). Comorbidities affecting pt's functional performance at time of assessment include:pneumonia, polysubstance abuse, and chronic pain . Orders placed for OT evaluation and treatment.  Performed at least two patient identifiers during session including name and wristband. Personal factors affecting pt at time of IE include:steps to enter environment, behavioral pattern, difficulty performing ADLS, difficulty performing IADLS , limited insight into deficits, compliance, decreased initiation and engagement , financial barriers, health management , and environment. Prior to admission, pt reports Ind with ADLs, Ind with IADLs, and (+) driving.  Upon evaluation: Pt requires S with UB ADLs, min A with LB ADLs, min A with xfers and min A with functional mobility 2* the following deficits impacting occupational performance: weakness, decreased dynamic sit/ stand  balance, decreased activity tolerance, decreased standing tolerance time for self care and functional mobility, impaired problem solving, impulsivity, decreased safety awareness, increased pain, impaired interpersonal skills, environmental deficits, decreased mobilty, and requiring external assistance to complete transitional movements. Pt to benefit from continued skilled OT tx while in the hospital to address deficits as defined above and maximize level of functional independence w ADL's and functional mobility. Occupational Performance areas to address include: bathing/shower, toilet hygiene, dressing, medication management, health maintenance, functional mobility, community mobility, clothing management, cleaning, meal prep, money management, and household maintenance. From OT standpoint, recommendation at time of d/c would be Level 3 (Min Resource Intensity).     Rehab Resource Intensity Level, OT: III (Minimum Resource Intensity)

## 2024-04-08 NOTE — PROGRESS NOTES
[facility]  Progress Note  Name: Lalito Cramer I  MRN: [id number]  Unit/Bed#: -01 I Date of Admission: 4/4/2024   Date of Service: 4/8/2024 I Hospital Day: 4    Assessment/Plan   * Sepsis (HCC)  Assessment & Plan  Presenting with concerns of PNA with sob and hypoxia  Being treated for pneumonia  Infectious work up negative   Completed course of antibiotics on 4/8  Sepsis resolved      Polysubstance abuse ([facility])  Assessment & Plan  History of alcohol and opioid use disorder  He had history of oxycodone abuse and valium abuse   He was at a detox facility for the past 11 days  Currently on Subutex 4 mg 4 times daily and Librium 10 mg 3 times daily  ED spoke with provider at [facility] and reordered his withdrawal medications (as above)  Continue subutex and librium   - Family and patient requesting return to drug/alchol rehab on discharge   - Discussed with toxicology and they did not recommend any further short-acting benzos at this time and weaning librium  - Discussed with family   - continue librium, will dc PRN ativan (to avoid further benzos)   - Patient and family hope for eventual discharge back to drug/alcohol rehab when medically cleared.  - Follow up psychiatry consult  - At this time, low suspicion for withdrawal, no indication to continue librium TID, discontinue on 4/8    Chronic pain  Assessment & Plan  Continue Lyrica and duloxetine 60 BID   Added Toradol for moderate to severe pain  Avoid narcotics given history of abuse unless absolutely necessary.    Seizures ([facility])  Assessment & Plan  Continue keppra   History is unclear to the patient or family whether he actually has a history of seizures.  However was found to be on medication list so we will continue for now.               VTE Pharmacologic Prophylaxis:   Moderate Risk (Score 3-4) - Pharmacological DVT Prophylaxis Ordered: enoxaparin (Lovenox).    Mobility:   Basic Mobility Inpatient Raw Score: 22  [facility] Goal: 7:  Walk 25 feet or more  JH-HLM Achieved: 7: Walk 25 feet or more  JH-HLM Goal achieved. Continue to encourage appropriate mobility.    Patient Centered Rounds: I performed bedside rounds with nursing staff today.   Discussions with Specialists or Other Care Team Provider: martinez    Education and Discussions with Family / Patient: Patient declined call to .     Total Time Spent on Date of Encounter in care of patient: 30+ mins. This time was spent on one or more of the following: performing physical exam; counseling and coordination of care; obtaining or reviewing history; documenting in the medical record; reviewing/ordering tests, medications or procedures; communicating with other healthcare professionals and discussing with patient's family/caregivers.    Current Length of Stay: 4 day(s)  Current Patient Status: Inpatient   Certification Statement: The patient will continue to require additional inpatient hospital stay due to psychiatry consult   Discharge Plan: Anticipate discharge in 24-48 hrs to rehab facility.    Code Status: Level 1 - Full Code    Subjective:   Patient was ignoring me on my assessment. Was on the phone and not responding to me    Objective:     Vitals:   Temp (24hrs), Av °F (36.7 °C), Min:97.8 °F (36.6 °C), Max:98.3 °F (36.8 °C)    Temp:  [97.8 °F (36.6 °C)-98.3 °F (36.8 °C)] 97.8 °F (36.6 °C)  HR:  [61-77] 77  Resp:  [15-18] 15  BP: (120-163)/(69-91) 163/91  SpO2:  [89 %-97 %] 97 %  Body mass index is 29.53 kg/m².     Input and Output Summary (last 24 hours):     Intake/Output Summary (Last 24 hours) at 2024 1228  Last data filed at 2024  Gross per 24 hour   Intake 600 ml   Output --   Net 600 ml       Physical Exam:   Physical Exam  Constitutional:       General: He is not in acute distress.     Appearance: Normal appearance. He is not toxic-appearing.   HENT:      Head: Normocephalic and atraumatic.      Nose: No congestion.   Eyes:      Conjunctiva/sclera:  Conjunctivae normal.      Pupils: Pupils are equal, round, and reactive to light.   Cardiovascular:      Rate and Rhythm: Normal rate and regular rhythm.   Pulmonary:      Effort: Pulmonary effort is normal. No respiratory distress.   Neurological:      General: No focal deficit present.      Mental Status: He is alert and oriented to person, place, and time. Mental status is at baseline.          Additional Data:     Labs:  Results from last 7 days   Lab Units 04/08/24  0443   WBC Thousand/uL 2.35*   HEMOGLOBIN g/dL 10.8*   HEMATOCRIT % 31.4*   PLATELETS Thousands/uL 121*   NEUTROS PCT % 9*   LYMPHS PCT % 60*   MONOS PCT % 15*   EOS PCT % 15*     Results from last 7 days   Lab Units 04/08/24  0443   SODIUM mmol/L 140   POTASSIUM mmol/L 3.9   CHLORIDE mmol/L 110*   CO2 mmol/L 26   BUN mg/dL 17   CREATININE mg/dL 0.66   ANION GAP mmol/L 4   CALCIUM mg/dL 8.3*   ALBUMIN g/dL 3.6   TOTAL BILIRUBIN mg/dL 0.61   ALK PHOS U/L 82   ALT U/L 41   AST U/L 41*   GLUCOSE RANDOM mg/dL 92     Results from last 7 days   Lab Units 04/04/24  0751   INR  0.96             Results from last 7 days   Lab Units 04/08/24  0443 04/07/24  0500 04/06/24  0601 04/05/24  0447 04/04/24  0751   LACTIC ACID mmol/L  --   --   --   --  1.2   PROCALCITONIN ng/ml 0.25 0.29* 0.54* 0.92* 0.14       Lines/Drains:  Invasive Devices       Peripheral Intravenous Line  Duration             Peripheral IV 04/04/24 Right Antecubital 4 days                          Imaging: No pertinent imaging reviewed.    Recent Cultures (last 7 days):   Results from last 7 days   Lab Units 04/04/24  1515 04/04/24  0821 04/04/24  0812   BLOOD CULTURE   --  No Growth After 4 Days. No Growth After 4 Days.   LEGIONELLA URINARY ANTIGEN  Negative  --   --        Last 24 Hours Medication List:   Current Facility-Administered Medications   Medication Dose Route Frequency Provider Last Rate    acetaminophen  975 mg Oral Q8H PRN JAMES Mckeon      albuterol  2.5 mg  Nebulization Q6H PRN Blake Neri MD      buprenorphine  4 mg Sublingual 4x Daily Korey Cam MD      dextromethorphan-guaiFENesin  10 mL Oral Q4H PRN Blake Neri MD      DULoxetine  60 mg Oral BID Blake Neri MD      enoxaparin  40 mg Subcutaneous Daily Blake Neri MD      hydrOXYzine HCL  25 mg Oral Q6H PRN Blake Neri MD      levETIRAcetam  500 mg Oral Q12H YARIEL Blake Neri MD      melatonin  3 mg Oral HS PRN JAMES Mckeon      polyethylene glycol  17 g Oral Daily Blake Neri MD      pregabalin  100 mg Oral BID Blake Neri MD          Today, Patient Was Seen By: José Miguel Mark MD    **Please Note: This note may have been constructed using a voice recognition system.**

## 2024-04-08 NOTE — RESPIRATORY THERAPY NOTE
04/07/24 2206   Respiratory Assessment   Assessment Type Assess only   General Appearance Alert;Awake   Respiratory Pattern Normal   Chest Assessment Chest expansion symmetrical   Bilateral Breath Sounds Clear   Cough None   Resp Comments Pt placed on cpap at this time   O2 Device RA   Non-Invasive Information   O2 Interface Device Face mask   Non-Invasive Ventilation Mode CPAP   $ Intermittent NIV Yes   SpO2 94 %   $ Pulse Oximetry Spot Check Charge Completed   Non-Invasive Settings   FiO2 (%) 32   PEEP/CPAP (cm H2O) 4   Rise Time 2   Non-Invasive Readings   Skin Intervention Skin intact   Total Rate 15   Spontaneous Vt (mL) 703   Spontaneous MV (mL) 6   Leak (lpm) 40   Non-Invasive Alarms   Low Insp Pressure Time (sec) 60 sec   High Resp Rate (BPM) 40 BPM   Apnea Interval (sec) 30     RT Ventilator Management Note  Lalito Cramer 63 y.o. male MRN: [id number]  Unit/Bed#: -01 Encounter: [id number]      Daily Screen    No data found in the last 10 encounters.           Physical Exam:   Assessment Type: Assess only  General Appearance: Alert, Awake  Respiratory Pattern: Normal  Chest Assessment: Chest expansion symmetrical  Bilateral Breath Sounds: Clear  Cough: None  O2 Device: RA      Resp Comments: Pt placed on cpap at this time

## 2024-04-09 LAB
25(OH)D3 SERPL-MCNC: 36.7 NG/ML (ref 30–100)
BACTERIA BLD CULT: NORMAL
BACTERIA BLD CULT: NORMAL
TSH SERPL DL<=0.05 MIU/L-ACNC: 0.94 UIU/ML (ref 0.45–4.5)

## 2024-04-09 PROCEDURE — 99232 SBSQ HOSP IP/OBS MODERATE 35: CPT | Performed by: STUDENT IN AN ORGANIZED HEALTH CARE EDUCATION/TRAINING PROGRAM

## 2024-04-09 PROCEDURE — 94660 CPAP INITIATION&MGMT: CPT

## 2024-04-09 PROCEDURE — 94760 N-INVAS EAR/PLS OXIMETRY 1: CPT

## 2024-04-09 RX ADMIN — BUPRENORPHINE 4 MG: 2 TABLET SUBLINGUAL at 00:00

## 2024-04-09 RX ADMIN — Medication 3 MG: at 00:00

## 2024-04-09 RX ADMIN — HYDROXYZINE HYDROCHLORIDE 25 MG: 25 TABLET ORAL at 17:22

## 2024-04-09 RX ADMIN — BUPRENORPHINE 4 MG: 2 TABLET SUBLINGUAL at 05:19

## 2024-04-09 RX ADMIN — HYDROXYZINE HYDROCHLORIDE 25 MG: 25 TABLET ORAL at 00:00

## 2024-04-09 RX ADMIN — ACETAMINOPHEN 975 MG: 325 TABLET, FILM COATED ORAL at 19:47

## 2024-04-09 RX ADMIN — BUPRENORPHINE 4 MG: 2 TABLET SUBLINGUAL at 23:38

## 2024-04-09 RX ADMIN — PREGABALIN 100 MG: 100 CAPSULE ORAL at 09:01

## 2024-04-09 RX ADMIN — BUPRENORPHINE 4 MG: 2 TABLET SUBLINGUAL at 12:44

## 2024-04-09 RX ADMIN — HYDROXYZINE HYDROCHLORIDE 25 MG: 25 TABLET ORAL at 23:38

## 2024-04-09 RX ADMIN — ENOXAPARIN SODIUM 40 MG: 40 INJECTION SUBCUTANEOUS at 20:32

## 2024-04-09 RX ADMIN — DULOXETINE HYDROCHLORIDE 60 MG: 60 CAPSULE, DELAYED RELEASE ORAL at 17:07

## 2024-04-09 RX ADMIN — Medication 3 MG: at 22:11

## 2024-04-09 RX ADMIN — HYDROXYZINE HYDROCHLORIDE 25 MG: 25 TABLET ORAL at 12:44

## 2024-04-09 RX ADMIN — PREGABALIN 100 MG: 100 CAPSULE ORAL at 17:07

## 2024-04-09 RX ADMIN — BUPRENORPHINE 4 MG: 2 TABLET SUBLINGUAL at 17:23

## 2024-04-09 RX ADMIN — LEVETIRACETAM 500 MG: 500 TABLET, FILM COATED ORAL at 20:32

## 2024-04-09 RX ADMIN — LEVETIRACETAM 500 MG: 500 TABLET, FILM COATED ORAL at 09:01

## 2024-04-09 RX ADMIN — DULOXETINE HYDROCHLORIDE 60 MG: 60 CAPSULE, DELAYED RELEASE ORAL at 09:01

## 2024-04-09 NOTE — PLAN OF CARE
Problem: Potential for Falls  Goal: Patient will remain free of falls  Description: INTERVENTIONS:  - Educate patient/family on patient safety including physical limitations  - Instruct patient to call for assistance with activity   - Consult OT/PT to assist with strengthening/mobility   - Keep Call bell within reach  - Keep bed low and locked with side rails adjusted as appropriate  - Keep care items and personal belongings within reach  - Initiate and maintain comfort rounds  - Make Fall Risk Sign visible to staff  - Offer Toileting every 2 Hours, in advance of need  -Initiate/Maintain bed alarm  - Obtain necessary fall risk management equipment: yellow socks  - Apply yellow socks and bracelet for high fall risk patients  - Consider moving patient to room near nurses station  Outcome: Progressing     Problem: Prexisting or High Potential for Compromised Skin Integrity  Goal: Skin integrity is maintained or improved  Description: INTERVENTIONS:  - Identify patients at risk for skin breakdown  - Assess and monitor skin integrity  - Assess and monitor nutrition and hydration status  - Monitor labs   - Assess for incontinence   - Turn and reposition patient  - Assist with mobility/ambulation  - Relieve pressure over bony prominences  - Avoid friction and shearing  - Provide appropriate hygiene as needed including keeping skin clean and dry  - Evaluate need for skin moisturizer/barrier cream  - Collaborate with interdisciplinary team   - Patient/family teaching  - Consider wound care consult   Outcome: Progressing     Problem: PAIN - ADULT  Goal: Verbalizes/displays adequate comfort level or baseline comfort level  Description: Interventions:  - Encourage patient to monitor pain and request assistance  - Assess pain using appropriate pain scale  - Administer analgesics based on type and severity of pain and evaluate response  - Implement non-pharmacological measures as appropriate and evaluate response  - Consider  cultural and social influences on pain and pain management  - Notify physician/advanced practitioner if interventions unsuccessful or patient reports new pain  Outcome: Progressing     Problem: INFECTION - ADULT  Goal: Absence or prevention of progression during hospitalization  Description: INTERVENTIONS:  - Assess and monitor for signs and symptoms of infection  - Monitor lab/diagnostic results  - Monitor all insertion sites, i.e. indwelling lines, tubes, and drains  - Monitor endotracheal if appropriate and nasal secretions for changes in amount and color  - Lemmon appropriate cooling/warming therapies per order  - Administer medications as ordered  - Instruct and encourage patient and family to use good hand hygiene technique  - Identify and instruct in appropriate isolation precautions for identified infection/condition  Outcome: Progressing  Goal: Absence of fever/infection during neutropenic period  Description: INTERVENTIONS:  - Monitor WBC    Outcome: Progressing     Problem: SAFETY ADULT  Goal: Patient will remain free of falls  Description: INTERVENTIONS:  - Educate patient/family on patient safety including physical limitations  - Instruct patient to call for assistance with activity   - Consult OT/PT to assist with strengthening/mobility   - Keep Call bell within reach  - Keep bed low and locked with side rails adjusted as appropriate  - Keep care items and personal belongings within reach  - Initiate and maintain comfort rounds  - Make Fall Risk Sign visible to staff  - Offer Toileting every 2 Hours, in advance of need  - Initiate/Maintain bed alarm  - Obtain necessary fall risk management equipment: yellow socks  - Apply yellow socks and bracelet for high fall risk patients  - Consider moving patient to room near nurses station  Outcome: Progressing  Goal: Maintain or return to baseline ADL function  Description: INTERVENTIONS:  -  Assess patient's ability to carry out ADLs; assess patient's  baseline for ADL function and identify physical deficits which impact ability to perform ADLs (bathing, care of mouth/teeth, toileting, grooming, dressing, etc.)  - Assess/evaluate cause of self-care deficits   - Assess range of motion  - Assess patient's mobility; develop plan if impaired  - Assess patient's need for assistive devices and provide as appropriate  - Encourage maximum independence but intervene and supervise when necessary  - Involve family in performance of ADLs  - Assess for home care needs following discharge   - Consider OT consult to assist with ADL evaluation and planning for discharge  - Provide patient education as appropriate  Outcome: Progressing  Goal: Maintains/Returns to pre admission functional level  Description: INTERVENTIONS:  - Perform AM-PAC 6 Click Basic Mobility/ Daily Activity assessment daily.  - Set and communicate daily mobility goal to care team and patient/family/caregiver.   - Collaborate with rehabilitation services on mobility goals if consulted  - Perform Range of Motion 2 times a day.  - Reposition patient every 2 hours.  - Dangle patient 2 times a day  - Stand patient 2 times a day  - Ambulate patient 2 times a day  - Out of bed to chair 2 times a day   - Out of bed for meals 2 times a day  - Out of bed for toileting  - Record patient progress and toleration of activity level   Outcome: Progressing     Problem: METABOLIC, FLUID AND ELECTROLYTES - ADULT  Goal: Electrolytes maintained within normal limits  Description: INTERVENTIONS:  - Monitor labs and assess patient for signs and symptoms of electrolyte imbalances  - Administer electrolyte replacement as ordered  - Monitor response to electrolyte replacements, including repeat lab results as appropriate  - Instruct patient on fluid and nutrition as appropriate  Outcome: Progressing  Goal: Fluid balance maintained  Description: INTERVENTIONS:  - Monitor labs   - Monitor I/O and WT  - Instruct patient on fluid and  nutrition as appropriate  - Assess for signs & symptoms of volume excess or deficit  Outcome: Progressing  Goal: Glucose maintained within target range  Description: INTERVENTIONS:  - Monitor Blood Glucose as ordered  - Assess for signs and symptoms of hyperglycemia and hypoglycemia  - Administer ordered medications to maintain glucose within target range  - Assess nutritional intake and initiate nutrition service referral as needed  Outcome: Progressing

## 2024-04-09 NOTE — CASE MANAGEMENT
Case Management Discharge Planning Note    Patient name Lalito Cramer  Location /-01 MRN [id number]  : 1960 Date 2024       Current Admission Date: 2024  Current Admission Diagnosis:Sepsis (HCC)   Patient Active Problem List    Diagnosis Date Noted    Pneumonia 2024    Sepsis (HCC) 2024    Polysubstance abuse (HCC) 2024    Seizures (HCC) 2024    Hyperlipidemia 2024    Chronic pain 2024      LOS (days): 5  Geometric Mean LOS (GMLOS) (days):   Days to GMLOS:     OBJECTIVE:  Risk of Unplanned Readmission Score: 15.13         Current admission status: Inpatient   Preferred Pharmacy:   [id number] [place], [place] - [address] [place] [address]  Phone: [phone] Fax: [phone]    Primary Care Provider: No primary care provider on file.    Primary Insurance: [employer]  Secondary Insurance:     DISCHARGE DETAILS:                                          Other Referral/Resources/Interventions Provided:  Interventions: Substance Abuse Treatment, Recovery Housing  Referral Comments: CM contacted [facility] to confirm D/C plans. Nursing staff at [facility] confirmed that they will transport pt back to facility once psych eval has been completed. CM notified Dr and will continue to follow.               Transport at Discharge : Other (Comment) ([facility] will transport pt back to facility)

## 2024-04-09 NOTE — PROGRESS NOTES
[facility]  Progress Note  Name: Lalito Cramer I  MRN: [id number]  Unit/Bed#: -01 I Date of Admission: 4/4/2024   Date of Service: 4/9/2024 I Hospital Day: 5    Assessment/Plan   Chronic pain  Assessment & Plan  Continue Lyrica and duloxetine 60 BID   Added Toradol for moderate to severe pain  Avoid narcotics given history of abuse unless absolutely necessary.    Hyperlipidemia  Assessment & Plan  Continue statin    Seizures (HCC)  Assessment & Plan  Continue keppra   History is unclear to the patient or family whether he actually has a history of seizures.  However was found to be on medication list so we will continue for now.    Polysubstance abuse (HCC)  Assessment & Plan  History of alcohol and opioid use disorder  He had history of oxycodone abuse and valium abuse   He was at a detox facility for the past 11 days  Currently on Subutex 4 mg 4 times daily and Librium 10 mg 3 times daily  ED spoke with provider at [facility] and reordered his withdrawal medications (as above)  Continue subutex and librium   - Family and patient requesting return to drug/alchol rehab on discharge   - Discussed with toxicology and they did not recommend any further short-acting benzos at this time and weaning librium  - Discussed with family   - continue librium, will dc PRN ativan (to avoid further benzos)   - Patient and family hope for eventual discharge back to drug/alcohol rehab when medically cleared.  - Follow up psychiatry consult  - At this time, low suspicion for withdrawal, no indication to continue librium TID, discontinue on 4/8    Pneumonia  Assessment & Plan  Presenting from drug/alcohol rehab facility with SOB and hypoxia requiring 4L NC   He reported this was ongoing for the last several days but worsened today and he developed some confusion  Confusion started a few days prior to admission while he was drug/alcohol rehab   Covid/rvp negative   Completed 5d course of  antibiotics  Sending urinary antigens - negative   Procal - elevated       * Sepsis (HCC)  Assessment & Plan  Presenting with concerns of PNA with sob and hypoxia  Being treated for pneumonia  Infectious work up negative   Completed course of antibiotics on 4/8  Sepsis resolved             VTE Pharmacologic Prophylaxis: VTE Score: 6 High Risk (Score >/= 5) - Pharmacological DVT Prophylaxis Ordered: enoxaparin (Lovenox). Sequential Compression Devices Ordered.    Mobility:   Basic Mobility Inpatient Raw Score: 22  JH-HLM Goal: 7: Walk 25 feet or more  JH-HLM Achieved: 7: Walk 25 feet or more  JH-HLM Goal achieved. Continue to encourage appropriate mobility.    Patient Centered Rounds: I performed bedside rounds with nursing staff today.   Discussions with Specialists or Other Care Team Provider: Psychiatry    Education and Discussions with Family / Patient: Attempted to update  (wife) via phone. Left voicemail. Then received message that daughter was requesting a call- was able to speak with her over the phone     Total Time Spent on Date of Encounter in care of patient: 40 mins. This time was spent on one or more of the following: performing physical exam; counseling and coordination of care; obtaining or reviewing history; documenting in the medical record; reviewing/ordering tests, medications or procedures; communicating with other healthcare professionals and discussing with patient's family/caregivers.    Current Length of Stay: 5 day(s)  Current Patient Status: Inpatient   Certification Statement: The patient will continue to require additional inpatient hospital stay due to psych eval  Discharge Plan: Anticipate discharge later today or tomorrow to rehab facility.    Code Status: Level 1 - Full Code    Subjective:   Patient reports ongoing chronic rib pain.  He was slightly tangential his explanations and reviewed his issues with pain management in the past and his current plan to go back to  rehab to assist in getting off chronic opiates.  No new symptoms overnight.    Objective:     Vitals:   Temp (24hrs), Av.5 °F (36.9 °C), Min:98.5 °F (36.9 °C), Max:98.5 °F (36.9 °C)    Temp:  [98.5 °F (36.9 °C)] 98.5 °F (36.9 °C)  HR:  [62-82] 82  Resp:  [15] 15  BP: (141-175)/(78-93) 175/93  SpO2:  [91 %-95 %] 93 %  Body mass index is 29.53 kg/m².     Input and Output Summary (last 24 hours):   No intake or output data in the 24 hours ending 24 1640    Physical Exam:   Physical Exam  Vitals and nursing note reviewed.   Constitutional:       General: He is not in acute distress.     Appearance: He is well-developed.   HENT:      Head: Normocephalic and atraumatic.   Eyes:      Conjunctiva/sclera: Conjunctivae normal.      Pupils: Pupils are equal, round, and reactive to light.   Cardiovascular:      Rate and Rhythm: Normal rate and regular rhythm.      Heart sounds: No murmur heard.  Pulmonary:      Effort: Pulmonary effort is normal. No respiratory distress.      Breath sounds: Normal breath sounds. No wheezing or rales.   Abdominal:      General: Abdomen is flat. Bowel sounds are normal. There is no distension.      Palpations: Abdomen is soft.      Tenderness: There is no abdominal tenderness. There is no guarding or rebound.   Musculoskeletal:         General: No swelling. Normal range of motion.      Cervical back: Normal range of motion.   Skin:     General: Skin is warm and dry.      Capillary Refill: Capillary refill takes less than 2 seconds.   Neurological:      General: No focal deficit present.      Mental Status: He is alert. Mental status is at baseline.   Psychiatric:         Mood and Affect: Mood normal.          Additional Data:     Labs:  Results from last 7 days   Lab Units 24  0443   WBC Thousand/uL 2.35*   HEMOGLOBIN g/dL 10.8*   HEMATOCRIT % 31.4*   PLATELETS Thousands/uL 121*   SEGS PCT % 9*   LYMPHO PCT % 60*   MONO PCT % 15*   EOS PCT % 15*     Results from last 7 days   Lab  Units 04/08/24  0443   SODIUM mmol/L 140   POTASSIUM mmol/L 3.9   CHLORIDE mmol/L 110*   CO2 mmol/L 26   BUN mg/dL 17   CREATININE mg/dL 0.66   ANION GAP mmol/L 4   CALCIUM mg/dL 8.3*   ALBUMIN g/dL 3.6   TOTAL BILIRUBIN mg/dL 0.61   ALK PHOS U/L 82   ALT U/L 41   AST U/L 41*   GLUCOSE RANDOM mg/dL 92     Results from last 7 days   Lab Units 04/04/24  0751   INR  0.96             Results from last 7 days   Lab Units 04/08/24  0443 04/07/24  0500 04/06/24  0601 04/05/24  0447 04/04/24  0751   LACTIC ACID mmol/L  --   --   --   --  1.2   PROCALCITONIN ng/ml 0.25 0.29* 0.54* 0.92* 0.14       Lines/Drains:  Invasive Devices       Peripheral Intravenous Line  Duration             Peripheral IV 04/04/24 Right Antecubital 5 days                          Imaging: Reviewed radiology reports from this admission including: chest xray    Recent Cultures (last 7 days):   Results from last 7 days   Lab Units 04/04/24  1515 04/04/24  0821 04/04/24  0812   BLOOD CULTURE   --  No Growth After 5 Days. No Growth After 5 Days.   LEGIONELLA URINARY ANTIGEN  Negative  --   --        Last 24 Hours Medication List:   Current Facility-Administered Medications   Medication Dose Route Frequency Provider Last Rate    acetaminophen  975 mg Oral Q8H PRN JAMES Mckeon      albuterol  2.5 mg Nebulization Q6H PRN Blake Neri MD      buprenorphine  4 mg Sublingual 4x Daily Korey Cam MD      dextromethorphan-guaiFENesin  10 mL Oral Q4H PRN Blake Neri MD      DULoxetine  60 mg Oral BID Blake Neri MD      enoxaparin  40 mg Subcutaneous Daily Blake Neri MD      hydrOXYzine HCL  25 mg Oral Q6H PRN Blake Neri MD      levETIRAcetam  500 mg Oral Q12H [facility] Blake Neri MD      melatonin  3 mg Oral HS PRN JAMES Mckeon      polyethylene glycol  17 g Oral Daily Blake Neri MD      pregabalin  100 mg Oral BID Blake Neri MD          Today, Patient Was Seen By: Dirk Woodruff MD    **Please Note: This note may have been  constructed using a voice recognition system.**

## 2024-04-09 NOTE — ASSESSMENT & PLAN NOTE
History of alcohol and opioid use disorder  He had history of oxycodone abuse and valium abuse   He was at a detox facility for the past 11 days  Currently on Subutex 4 mg 4 times daily and Librium 10 mg 3 times daily  ED spoke with provider at [facility] and reordered his withdrawal medications (as above)  Continue subutex and librium   - Family and patient requesting return to drug/alchol rehab on discharge   - Discussed with toxicology and they did not recommend any further short-acting benzos at this time and weaning librium  - Discussed with family   - continue librium, will dc PRN ativan (to avoid further benzos)   - Patient and family hope for eventual discharge back to drug/alcohol rehab when medically cleared.  - Follow up psychiatry consult  - At this time, low suspicion for withdrawal, no indication to continue librium TID, discontinue on 4/8

## 2024-04-09 NOTE — ASSESSMENT & PLAN NOTE
Presenting from drug/alcohol rehab facility with SOB and hypoxia requiring 4L NC   He reported this was ongoing for the last several days but worsened today and he developed some confusion  Confusion started a few days prior to admission while he was drug/alcohol rehab   Covid/rvp negative   Completed 5d course of antibiotics  Sending urinary antigens - negative   Procal - elevated

## 2024-04-10 VITALS
RESPIRATION RATE: 18 BRPM | OXYGEN SATURATION: 97 % | WEIGHT: 200 LBS | SYSTOLIC BLOOD PRESSURE: 141 MMHG | TEMPERATURE: 97.9 F | DIASTOLIC BLOOD PRESSURE: 83 MMHG | HEART RATE: 67 BPM | HEIGHT: 69 IN | BODY MASS INDEX: 29.62 KG/M2

## 2024-04-10 PROBLEM — A41.9 SEPSIS (HCC): Status: RESOLVED | Noted: 2024-04-04 | Resolved: 2024-04-10

## 2024-04-10 PROBLEM — J18.9 PNEUMONIA: Status: RESOLVED | Noted: 2024-04-04 | Resolved: 2024-04-10

## 2024-04-10 PROCEDURE — 99239 HOSP IP/OBS DSCHRG MGMT >30: CPT | Performed by: STUDENT IN AN ORGANIZED HEALTH CARE EDUCATION/TRAINING PROGRAM

## 2024-04-10 RX ORDER — HYDROXYZINE HYDROCHLORIDE 25 MG/1
25 TABLET, FILM COATED ORAL EVERY 6 HOURS PRN
Start: 2024-04-10

## 2024-04-10 RX ORDER — MELOXICAM 7.5 MG/1
7.5 TABLET ORAL DAILY
Start: 2024-04-11

## 2024-04-10 RX ORDER — POLYETHYLENE GLYCOL 3350 17 G/17G
17 POWDER, FOR SOLUTION ORAL DAILY PRN
Start: 2024-04-10

## 2024-04-10 RX ORDER — MELOXICAM 7.5 MG/1
7.5 TABLET ORAL DAILY
Status: DISCONTINUED | OUTPATIENT
Start: 2024-04-10 | End: 2024-04-10 | Stop reason: HOSPADM

## 2024-04-10 RX ADMIN — MELOXICAM 7.5 MG: 7.5 TABLET ORAL at 09:35

## 2024-04-10 RX ADMIN — LEVETIRACETAM 500 MG: 500 TABLET, FILM COATED ORAL at 08:37

## 2024-04-10 RX ADMIN — BUPRENORPHINE 4 MG: 2 TABLET SUBLINGUAL at 13:04

## 2024-04-10 RX ADMIN — PREGABALIN 100 MG: 100 CAPSULE ORAL at 08:37

## 2024-04-10 RX ADMIN — HYDROXYZINE HYDROCHLORIDE 25 MG: 25 TABLET ORAL at 09:33

## 2024-04-10 RX ADMIN — BUPRENORPHINE 4 MG: 2 TABLET SUBLINGUAL at 05:19

## 2024-04-10 RX ADMIN — DULOXETINE HYDROCHLORIDE 60 MG: 60 CAPSULE, DELAYED RELEASE ORAL at 08:37

## 2024-04-10 NOTE — CASE MANAGEMENT
Case Management Discharge Planning Note    Patient name Lalito Cramer  Location /-01 MRN [id number]  : 1960 Date 4/10/2024       Current Admission Date: 2024  Current Admission Diagnosis:Polysubstance abuse (HCC)   Patient Active Problem List    Diagnosis Date Noted    Polysubstance abuse (HCC) 2024    Seizures (HCC) 2024    Hyperlipidemia 2024    Chronic pain 2024      LOS (days): 6  Geometric Mean LOS (GMLOS) (days):   Days to GMLOS:     OBJECTIVE:  Risk of Unplanned Readmission Score: 11.43         Current admission status: Inpatient   Preferred Pharmacy:   [id number] [place], [place] - [address] [place] [address]  [address] [place] [id number]  Phone: [phone] Fax: [phone]    Primary Care Provider: No primary care provider on file.    Primary Insurance: [employer]  Secondary Insurance:     DISCHARGE DETAILS:                                          Other Referral/Resources/Interventions Provided:  Interventions: Transportation  Referral Comments: CM met with pt at bedside to obtain pt signature to release records to [facility]. CM faxed over pt records to [facility]. PITER spoke with [facility] nursing staff to arrange transportation. [facility] reported that they will have transportation come to transport pt to facility around 1430. CM notifed Dr, nurse, pt, pt's wife, and left message for pt's daughter.                                                           Accepting Facility Name, City & State : [facility]  Receiving Facility/Agency Phone Number: [phone]  Facility/Agency Fax Number: [phone]

## 2024-04-10 NOTE — RESPIRATORY THERAPY NOTE
RT Protocol Note  Lalito Cramer 63 y.o. male MRN: [id number]  Unit/Bed#: -01 Encounter: [id number]    Assessment    Principal Problem:    Sepsis (HCC)  Active Problems:    Pneumonia    Polysubstance abuse (HCC)    Seizures (HCC)    Hyperlipidemia    Chronic pain      Home Pulmonary Medications:     04/09/24 2222   Respiratory Assessment   Assessment Type Assess only   General Appearance Drowsy   Respiratory Pattern Normal   Chest Assessment Chest expansion symmetrical   Bilateral Breath Sounds Diminished   Cough None   Resp Comments Pt placed on cpap at this time   O2 Device V30   Non-Invasive Information   O2 Interface Device Face mask   Non-Invasive Ventilation Mode CPAP   $ Intermittent NIV Yes   SpO2 94 %   $ Pulse Oximetry Spot Check Charge Completed   Non-Invasive Settings   FiO2 (%) 32   PEEP/CPAP (cm H2O) 4   Rise Time 2   Non-Invasive Readings   Skin Intervention Skin intact   Total Rate 10   Spontaneous Vt (mL) 741   Spontaneous MV (mL) 4   Leak (lpm) 40   Non-Invasive Alarms   Low Insp Pressure Time (sec) 60 sec   High Resp Rate (BPM) 40 BPM   Apnea Interval (sec) 30       Home Devices/Therapy: BiPAP/CPAP    History reviewed. No pertinent past medical history.  Social History     Socioeconomic History    Marital status: /Civil Union     Spouse name: None    Number of children: None    Years of education: None    Highest education level: None   Occupational History    None   Tobacco Use    Smoking status: Never    Smokeless tobacco: Never   Substance and Sexual Activity    Alcohol use: Not Currently    Drug use: None    Sexual activity: None   Other Topics Concern    None   Social History Narrative    None     Social Determinants of Health     Financial Resource Strain: Not on file   Food Insecurity: No Food Insecurity (4/5/2024)    Hunger Vital Sign     Worried About Running Out of Food in the Last Year: Never true     Ran Out of Food in the Last Year: Never true   Transportation Needs: No  "Transportation Needs (4/5/2024)    PRAPARE - Transportation     Lack of Transportation (Medical): No     Lack of Transportation (Non-Medical): No   Physical Activity: Not on file   Stress: Not on file   Social Connections: Not on file   Intimate Partner Violence: Not on file   Housing Stability: Low Risk  (4/5/2024)    Housing Stability Vital Sign     Unable to Pay for Housing in the Last Year: No     Number of Places Lived in the Last Year: 1     Unstable Housing in the Last Year: No       Subjective         Objective    Physical Exam:   Assessment Type: Assess only  General Appearance: Drowsy  Respiratory Pattern: Normal  Chest Assessment: Chest expansion symmetrical  Bilateral Breath Sounds: Diminished  Cough: None  O2 Device: V30    Vitals:  Blood pressure (!) 175/93, pulse 82, temperature 98.5 °F (36.9 °C), resp. rate 15, height 5' 9\" (1.753 m), weight 90.7 kg (200 lb), SpO2 94%.          Imaging and other studies: I have personally reviewed pertinent reports.      O2 Device: V30     Plan    Respiratory Plan: No distress/Pulmonary history        Resp Comments: Pt placed on cpap at this time   "

## 2024-04-10 NOTE — PLAN OF CARE
Problem: Potential for Falls  Goal: Patient will remain free of falls  Description: INTERVENTIONS:  - Educate patient/family on patient safety including physical limitations  - Instruct patient to call for assistance with activity   - Consult OT/PT to assist with strengthening/mobility   - Keep Call bell within reach  - Keep bed low and locked with side rails adjusted as appropriate  - Keep care items and personal belongings within reach  - Initiate and maintain comfort rounds  - Make Fall Risk Sign visible to staff  - Offer Toileting every 2 Hours, in advance of need  - Initiate/Maintain bed/chair alarm  - Obtain necessary fall risk management equipment: call bell within reach   - Apply yellow socks and bracelet for high fall risk patients  - Consider moving patient to room near nurses station  Outcome: Progressing     Problem: Prexisting or High Potential for Compromised Skin Integrity  Goal: Skin integrity is maintained or improved  Description: INTERVENTIONS:  - Identify patients at risk for skin breakdown  - Assess and monitor skin integrity  - Assess and monitor nutrition and hydration status  - Monitor labs   - Assess for incontinence   - Turn and reposition patient  - Assist with mobility/ambulation  - Relieve pressure over bony prominences  - Avoid friction and shearing  - Provide appropriate hygiene as needed including keeping skin clean and dry  - Evaluate need for skin moisturizer/barrier cream  - Collaborate with interdisciplinary team   - Patient/family teaching  - Consider wound care consult   Outcome: Progressing

## 2024-04-10 NOTE — DISCHARGE SUMMARY
[facility]  Discharge- Lalito Cramer 1960, 63 y.o. male MRN: [id number]  Unit/Bed#: -[facility] Encounter: [id number]  Primary Care Provider: No primary care provider on file.   Date and time admitted to hospital: 4/4/2024  7:14 AM    Chronic pain  Assessment & Plan  Continue Lyrica and duloxetine 60 BID   Avoid narcotics given history of abuse unless absolutely necessary.  Start meloxicam as above    Hyperlipidemia  Assessment & Plan  Continue statin    Seizures (HCC)  Assessment & Plan  Continue keppra   History is unclear to the patient or family whether he actually has a history of seizures.  However was found to be on medication list so we will continue for now.    Polysubstance abuse (HCC)  Assessment & Plan  History of alcohol and opioid use disorder  He had history of oxycodone abuse and valium abuse   He was at a detox facility for the past 11 days  Currently on Subutex 4 mg 4 times daily and Librium 10 mg 3 times daily  ED spoke with provider at [place] and reordered his withdrawal medications (as above)  Continue subutex and librium   - Family and patient requesting return to drug/alchol rehab on discharge   - Discussed with toxicology and they did not recommend any further short-acting benzos at this time and weaning librium  - Discussed with family   - continue librium, will dc PRN ativan (to avoid further benzos)   - Patient and family hope for eventual discharge back to drug/alcohol rehab when medically cleared.  - Follow up psychiatry consult  - At this time, low suspicion for withdrawal, no indication to continue librium TID, discontinue on 4/8  - Start meloxicam for additional analgesia    Pneumonia-resolved as of 4/10/2024  Assessment & Plan  Presenting from drug/alcohol rehab facility with SOB and hypoxia requiring 4L NC   He reported this was ongoing for the last several days but worsened today and he developed some confusion  Confusion started a few days prior  to admission while he was drug/alcohol rehab   Covid/rvp negative   Completed 5d course of antibiotics  Sending urinary antigens - negative   Procal - elevated       * Sepsis (HCC)-resolved as of 4/10/2024  Assessment & Plan  Presenting with concerns of PNA with sob and hypoxia  Being treated for pneumonia  Infectious work up negative   Completed course of antibiotics on 4/8  Sepsis resolved        Medical Problems       Resolved Problems  Never Reviewed            Resolved    Pneumonia 4/10/2024     Resolved by  Dirk Woodruff MD    * (Principal) Sepsis (HCC) 4/10/2024     Resolved by  Dirk Woodruff MD        Discharging Physician / Practitioner: Dirk Woodruff MD  PCP: No primary care provider on file.  Admission Date:   Admission Orders (From admission, onward)       Ordered        04/04/24 0920  INPATIENT ADMISSION  Once                          Discharge Date: 04/10/24    Consultations During Hospital Stay:  Psychiatry  Toxicology    Procedures Performed:   None    Significant Findings / Test Results:   XR chest portable   Final Result by Sekou Allen MD (04/04 0857)      No acute cardiopulmonary disease. Chronic changes.            Workstation performed: [id number]             Incidental Findings:   None    Test Results Pending at Discharge (will require follow up):   None     Outpatient Tests Requested:  None (patient states that he is due for an outpatient CT through his specialist at [place])    Complications: None    Reason for Admission: Shortness of breath, cough    Hospital Course:   Lalito Cramer is a 63 y.o. male patient who originally presented to the hospital on 4/4/2024 due to shortness of breath, cough.  X-ray was obtained which showed no acute cardiopulmonary disease, however procalcitonin was elevated.  He had criteria for sepsis secondary to pneumonia and was treated with IV antibiotics and completed his entire course while in the hospital.    Psychiatry was  "consulted for medication optimization.  They recommended continuing current regimen for now and following up with psychiatrist at [facility].  Patient did express concerns about his long-term plans for analgesia given that he is coming off opiates.  Discussed that he will need follow-up with his pain management doctor to decide on a regimen for the long-term, but started meloxicam daily for now to provide some additional support.    Please see above list of diagnoses and related plan for additional information.     Condition at Discharge: good    Discharge Day Visit / Exam:   Subjective: Patient reports he feels okay today.  Continues to express right rib pain which is chronic.  No other new issues overnight.  Vitals: Blood Pressure: 141/83 (04/10/24 0654)  Pulse: 67 (04/10/24 0654)  Temperature: 97.9 °F (36.6 °C) (04/10/24 0654)  Temp Source: Oral (04/06/24 2212)  Respirations: 18 (04/10/24 0654)  Height: 5' 9\" (175.3 cm) (04/04/24 1809)  Weight - Scale: 90.7 kg (200 lb) (04/04/24 1809)  SpO2: 97 % (04/10/24 0654)  Exam:   Physical Exam  Vitals and nursing note reviewed.   Constitutional:       General: He is not in acute distress.     Appearance: He is well-developed.   HENT:      Head: Normocephalic and atraumatic.   Eyes:      Conjunctiva/sclera: Conjunctivae normal.      Pupils: Pupils are equal, round, and reactive to light.   Cardiovascular:      Rate and Rhythm: Normal rate and regular rhythm.      Heart sounds: No murmur heard.  Pulmonary:      Effort: Pulmonary effort is normal. No respiratory distress.      Breath sounds: Normal breath sounds. No wheezing or rales.   Abdominal:      General: Abdomen is flat. Bowel sounds are normal. There is no distension.      Palpations: Abdomen is soft.      Tenderness: There is no abdominal tenderness. There is no guarding or rebound.   Musculoskeletal:         General: No swelling. Normal range of motion.      Cervical back: Normal range of motion.   Skin:     " General: Skin is warm and dry.      Capillary Refill: Capillary refill takes less than 2 seconds.   Neurological:      General: No focal deficit present.      Mental Status: He is alert. Mental status is at baseline.   Psychiatric:         Mood and Affect: Mood normal.         Discussion with Family: Updated  (wife) via phone.    Discharge instructions/Information to patient and family:   See after visit summary for information provided to patient and family.      Provisions for Follow-Up Care:  See after visit summary for information related to follow-up care and any pertinent home health orders.      Mobility at time of Discharge:   Basic Mobility Inpatient Raw Score: 22  JH-HLM Goal: 7: Walk 25 feet or more  JH-HLM Achieved: 3: Sit at edge of bed  HLM Goal NOT achieved. Continue to encourage mobility in post discharge setting.     Disposition:   Other: Discharged to [facility] rehab    Planned Readmission: None     Discharge Statement:  I spent 35 minutes discharging the patient. This time was spent on the day of discharge. I had direct contact with the patient on the day of discharge. Greater than 50% of the total time was spent examining patient, answering all patient questions, arranging and discussing plan of care with patient as well as directly providing post-discharge instructions.  Additional time then spent on discharge activities.    Discharge Medications:  See after visit summary for reconciled discharge medications provided to patient and/or family.      **Please Note: This note may have been constructed using a voice recognition system**

## 2024-04-10 NOTE — RESPIRATORY THERAPY NOTE
04/10/24 0421   Respiratory Assessment   Assessment Type Assess only   General Appearance Sleeping   Respiratory Pattern Normal   Chest Assessment Chest expansion symmetrical   Bilateral Breath Sounds Diminished   Cough None   Resp Comments Pt remains on cpap   O2 Device V30   Non-Invasive Information   O2 Interface Device Face mask   Non-Invasive Ventilation Mode CPAP   SpO2 96 %   Non-Invasive Settings   FiO2 (%) 32   PEEP/CPAP (cm H2O) 4   Rise Time 2   Non-Invasive Readings   Skin Intervention Skin intact   Total Rate 12   Spontaneous Vt (mL) 577   Spontaneous MV (mL) 4.2   Leak (lpm) 40   Non-Invasive Alarms   Low Insp Pressure Time (sec) 60 sec   High Resp Rate (BPM) 40 BPM   Apnea Interval (sec) 30     RT Ventilator Management Note  Lalito Cramer 63 y.o. male MRN: [id number]  Unit/Bed#: -01 Encounter: [id number]      Daily Screen    No data found in the last 10 encounters.           Physical Exam:   Assessment Type: Assess only  General Appearance: Sleeping  Respiratory Pattern: Normal  Chest Assessment: Chest expansion symmetrical  Bilateral Breath Sounds: Diminished  Cough: None  O2 Device: V30      Resp Comments: Pt remains on cpap

## 2024-04-10 NOTE — DISCHARGE INSTR - AVS FIRST PAGE
Follow-up outpatient with your pain management specialist.  You will need to have your previously scheduled CT done as an outpatient and follow-up with your rib doctor for further care.    Continue to work on healthy, sustainable weight loss. This includes healthy eating habits (I have attached the DASH diet and Mediterranean diet as two potential options) and regular aerobic exercise (goal for at least 20 minutes per day) such as jogging, biking, or swimming.

## 2024-04-10 NOTE — PLAN OF CARE
Problem: Potential for Falls  Goal: Patient will remain free of falls  Description: INTERVENTIONS:  - Educate patient/family on patient safety including physical limitations  - Instruct patient to call for assistance with activity   - Consult OT/PT to assist with strengthening/mobility   - Keep Call bell within reach  - Keep bed low and locked with side rails adjusted as appropriate  - Keep care items and personal belongings within reach  - Initiate and maintain comfort rounds  - Make Fall Risk Sign visible to staff  - Offer Toileting every 2 Hours, in advance of need  -Initiate/Maintain bed alarm  - Obtain necessary fall risk management equipment: yellow socks  - Apply yellow socks and bracelet for high fall risk patients  - Consider moving patient to room near nurses station  Outcome: Progressing     Problem: Prexisting or High Potential for Compromised Skin Integrity  Goal: Skin integrity is maintained or improved  Description: INTERVENTIONS:  - Identify patients at risk for skin breakdown  - Assess and monitor skin integrity  - Assess and monitor nutrition and hydration status  - Monitor labs   - Assess for incontinence   - Turn and reposition patient  - Assist with mobility/ambulation  - Relieve pressure over bony prominences  - Avoid friction and shearing  - Provide appropriate hygiene as needed including keeping skin clean and dry  - Evaluate need for skin moisturizer/barrier cream  - Collaborate with interdisciplinary team   - Patient/family teaching  - Consider wound care consult   Outcome: Progressing     Problem: PAIN - ADULT  Goal: Verbalizes/displays adequate comfort level or baseline comfort level  Description: Interventions:  - Encourage patient to monitor pain and request assistance  - Assess pain using appropriate pain scale  - Administer analgesics based on type and severity of pain and evaluate response  - Implement non-pharmacological measures as appropriate and evaluate response  - Consider  cultural and social influences on pain and pain management  - Notify physician/advanced practitioner if interventions unsuccessful or patient reports new pain  Outcome: Progressing     Problem: INFECTION - ADULT  Goal: Absence or prevention of progression during hospitalization  Description: INTERVENTIONS:  - Assess and monitor for signs and symptoms of infection  - Monitor lab/diagnostic results  - Monitor all insertion sites, i.e. indwelling lines, tubes, and drains  - Monitor endotracheal if appropriate and nasal secretions for changes in amount and color  - [place] appropriate cooling/warming therapies per order  - Administer medications as ordered  - Instruct and encourage patient and family to use good hand hygiene technique  - Identify and instruct in appropriate isolation precautions for identified infection/condition  Outcome: Progressing  Goal: Absence of fever/infection during neutropenic period  Description: INTERVENTIONS:  - Monitor WBC    Outcome: Progressing     Problem: SAFETY ADULT  Goal: Patient will remain free of falls  Description: INTERVENTIONS:  - Educate patient/family on patient safety including physical limitations  - Instruct patient to call for assistance with activity   - Consult OT/PT to assist with strengthening/mobility   - Keep Call bell within reach  - Keep bed low and locked with side rails adjusted as appropriate  - Keep care items and personal belongings within reach  - Initiate and maintain comfort rounds  - Make Fall Risk Sign visible to staff  - Offer Toileting every 2 Hours, in advance of need  - Initiate/Maintain bed alarm  - Obtain necessary fall risk management equipment: yellow socks  - Apply yellow socks and bracelet for high fall risk patients  - Consider moving patient to room near nurses station  Outcome: Progressing  Goal: Maintain or return to baseline ADL function  Description: INTERVENTIONS:  -  Assess patient's ability to carry out ADLs; assess patient's  baseline for ADL function and identify physical deficits which impact ability to perform ADLs (bathing, care of mouth/teeth, toileting, grooming, dressing, etc.)  - Assess/evaluate cause of self-care deficits   - Assess range of motion  - Assess patient's mobility; develop plan if impaired  - Assess patient's need for assistive devices and provide as appropriate  - Encourage maximum independence but intervene and supervise when necessary  - Involve family in performance of ADLs  - Assess for home care needs following discharge   - Consider OT consult to assist with ADL evaluation and planning for discharge  - Provide patient education as appropriate  Outcome: Progressing  Goal: Maintains/Returns to pre admission functional level  Description: INTERVENTIONS:  - Perform AM-PAC 6 Click Basic Mobility/ Daily Activity assessment daily.  - Set and communicate daily mobility goal to care team and patient/family/caregiver.   - Collaborate with rehabilitation services on mobility goals if consulted  - Perform Range of Motion 2 times a day.  - Reposition patient every 2 hours.  - Dangle patient 2 times a day  - Stand patient 2 times a day  - Ambulate patient 2 times a day  - Out of bed to chair 2 times a day   - Out of bed for meals 2 times a day  - Out of bed for toileting  - Record patient progress and toleration of activity level   Outcome: Progressing     Problem: METABOLIC, FLUID AND ELECTROLYTES - ADULT  Goal: Electrolytes maintained within normal limits  Description: INTERVENTIONS:  - Monitor labs and assess patient for signs and symptoms of electrolyte imbalances  - Administer electrolyte replacement as ordered  - Monitor response to electrolyte replacements, including repeat lab results as appropriate  - Instruct patient on fluid and nutrition as appropriate  Outcome: Progressing  Goal: Fluid balance maintained  Description: INTERVENTIONS:  - Monitor labs   - Monitor I/O and WT  - Instruct patient on fluid and  nutrition as appropriate  - Assess for signs & symptoms of volume excess or deficit  Outcome: Progressing  Goal: Glucose maintained within target range  Description: INTERVENTIONS:  - Monitor Blood Glucose as ordered  - Assess for signs and symptoms of hyperglycemia and hypoglycemia  - Administer ordered medications to maintain glucose within target range  - Assess nutritional intake and initiate nutrition service referral as needed  Outcome: Progressing

## 2024-04-10 NOTE — UTILIZATION REVIEW
NOTIFICATION OF ADMISSION DISCHARGE   This is a Notification of Discharge from [facility]. Please be advised that this patient has been discharge from our facility. Below you will find the admission and discharge date and time including the patient’s disposition.   UTILIZATION REVIEW CONTACT:  Jaylin Rodas  Utilization   Network Utilization Review Department  Phone: [phone] x carefully listen to the prompts. All voicemails are confidential.  Email: [email or web address]     ADMISSION INFORMATION  PRESENTATION DATE: 4/4/2024  7:14 AM  OBERVATION ADMISSION DATE:   INPATIENT ADMISSION DATE: 4/4/24  9:21 AM   DISCHARGE DATE: 4/10/2024  1:26 PM   DISPOSITION:Home/Self Care    Network Utilization Review Department  ATTENTION: Please call with any questions or concerns to [phone] and carefully listen to the prompts so that you are directed to the right person. All voicemails are confidential.   For Discharge needs, contact Care Management DC Support Team at [phone] opt. 2  Send all requests for admission clinical reviews, approved or denied determinations and any other requests to dedicated fax number below belonging to the campus where the patient is receiving treatment. List of dedicated fax numbers for the Facilities:  FACILITY NAME UR FAX NUMBER   ADMISSION DENIALS (Administrative/Medical Necessity) [phone]   DISCHARGE SUPPORT TEAM ([facility]) [phone]   PARENT CHILD HEALTH (Maternity/NICU/Pediatrics) [phone]   [facility] [phone]   [facility] [phone]   [facility] [phone]   [facility] [phone]   [facility] [phone]   [facility] [phone]   [facility] [phone]   [facility]  [phone]   [facility] [phone]   [facility] [phone]   [facility] [phone]   [facility] [phone]

## 2024-04-10 NOTE — ASSESSMENT & PLAN NOTE
History of alcohol and opioid use disorder  He had history of oxycodone abuse and valium abuse   He was at a detox facility for the past 11 days  Currently on Subutex 4 mg 4 times daily and Librium 10 mg 3 times daily  ED spoke with provider at [place] and reordered his withdrawal medications (as above)  Continue subutex and librium   - Family and patient requesting return to drug/alchol rehab on discharge   - Discussed with toxicology and they did not recommend any further short-acting benzos at this time and weaning librium  - Discussed with family   - continue librium, will dc PRN ativan (to avoid further benzos)   - Patient and family hope for eventual discharge back to drug/alcohol rehab when medically cleared.  - Follow up psychiatry consult  - At this time, low suspicion for withdrawal, no indication to continue librium TID, discontinue on 4/8  - Start meloxicam for additional analgesia

## 2024-04-10 NOTE — ASSESSMENT & PLAN NOTE
Continue Lyrica and duloxetine 60 BID   Avoid narcotics given history of abuse unless absolutely necessary.  Start meloxicam as above

## 2024-04-20 ENCOUNTER — HOSPITAL ENCOUNTER (EMERGENCY)
Facility: HOSPITAL | Age: 64
Discharge: HOME | End: 2024-04-20
Attending: EMERGENCY MEDICINE | Admitting: EMERGENCY MEDICINE
Payer: COMMERCIAL

## 2024-04-20 ENCOUNTER — APPOINTMENT (EMERGENCY)
Dept: RADIOLOGY | Facility: HOSPITAL | Age: 64
End: 2024-04-20
Payer: COMMERCIAL

## 2024-04-20 VITALS
RESPIRATION RATE: 18 BRPM | SYSTOLIC BLOOD PRESSURE: 144 MMHG | DIASTOLIC BLOOD PRESSURE: 78 MMHG | OXYGEN SATURATION: 96 % | HEART RATE: 89 BPM | TEMPERATURE: 97.5 F

## 2024-04-20 DIAGNOSIS — R07.89 CHEST WALL PAIN: Primary | ICD-10-CM

## 2024-04-20 DIAGNOSIS — G89.29 CHRONIC PAIN: ICD-10-CM

## 2024-04-20 PROCEDURE — 96372 THER/PROPH/DIAG INJ SC/IM: CPT

## 2024-04-20 PROCEDURE — 99284 EMERGENCY DEPT VISIT MOD MDM: CPT | Performed by: EMERGENCY MEDICINE

## 2024-04-20 PROCEDURE — 99285 EMERGENCY DEPT VISIT HI MDM: CPT

## 2024-04-20 PROCEDURE — 71045 X-RAY EXAM CHEST 1 VIEW: CPT

## 2024-04-20 RX ORDER — KETOROLAC TROMETHAMINE 10 MG/1
10 TABLET, FILM COATED ORAL EVERY 6 HOURS PRN
Qty: 15 TABLET | Refills: 0 | Status: SHIPPED | OUTPATIENT
Start: 2024-04-20

## 2024-04-20 RX ORDER — KETOROLAC TROMETHAMINE 30 MG/ML
30 INJECTION, SOLUTION INTRAMUSCULAR; INTRAVENOUS ONCE
Status: COMPLETED | OUTPATIENT
Start: 2024-04-20 | End: 2024-04-20

## 2024-04-20 RX ADMIN — KETOROLAC TROMETHAMINE 30 MG: 30 INJECTION, SOLUTION INTRAMUSCULAR; INTRAVENOUS at 17:38

## 2024-04-20 NOTE — ED NOTES
[facility] ([phone]) contacted and provided with update on plan of care and discharge instructions, facility will arrange transportation to  patient.     Monserrat Purdy RN  04/20/24 4106

## 2024-04-20 NOTE — DISCHARGE INSTRUCTIONS
A  personal message from Dr. Inder Riley,  Thank you so much for allowing me to care for you today.    I pride myself in the care and attention I give all my patients.  I hope you were a witness to this tonight.   If for any reason your condition does not improve or worsens, or you have a question that was not answered during your visit you can feel free to text me on my personal phone #  # [phone].   I will answer to your message and continue your care past your emergency room visit.     Please understand that although you are being discharged because your condition has been deemed stable and able to be managed on an outpatient setting. However your condition may worsen as part of the natural progression of the illness/condition, if this occurs please come back to the emergency department for a repeat evaluation.

## 2024-04-20 NOTE — ED PROVIDER NOTES
History  Chief Complaint   Patient presents with    Flank Pain     Came in for complaints of rib pain (right sided), 8/10. Took tylenol, no relief. Hx of a fall, rib fractures  x 2 years.     R rib pain - acute on chronic, x 2 + years  Patient currently on drug rehab, narcotics  Recent admission to hospital for PNA       History provided by:  Patient   used: No    Flank Pain  Associated symptoms: no chest pain, no chills, no cough, no dysuria, no fever, no hematuria, no shortness of breath, no sore throat and no vomiting        Prior to Admission Medications   Prescriptions Last Dose Informant Patient Reported? Taking?   DULoxetine (CYMBALTA) 60 mg delayed release capsule   Yes No   Sig: Take 60 mg by mouth daily   buprenorphine (SUBUTEX) 2 mg   Yes No   Sig: Place 4 mg under the tongue 4 (four) times a day   hydrOXYzine HCL (ATARAX) 25 mg tablet   No No   Sig: Take 1 tablet (25 mg total) by mouth every 6 (six) hours as needed for anxiety   levETIRAcetam (KEPPRA) 500 mg tablet   Yes No   Sig: Take 500 mg by mouth every 12 (twelve) hours   meloxicam (MOBIC) 7.5 mg tablet   No No   Sig: Take 1 tablet (7.5 mg total) by mouth daily   polyethylene glycol (MIRALAX) 17 g packet   No No   Sig: Take 17 g by mouth daily as needed (constipation)   pregabalin (LYRICA) 25 mg capsule   Yes No   Sig: Take 100 mg by mouth 2 (two) times a day      Facility-Administered Medications: None       History reviewed. No pertinent past medical history.    History reviewed. No pertinent surgical history.    History reviewed. No pertinent family history.  I have reviewed and agree with the history as documented.    E-Cigarette/Vaping     E-Cigarette/Vaping Substances     Social History     Tobacco Use    Smoking status: Never    Smokeless tobacco: Never   Substance Use Topics    Alcohol use: Not Currently       Review of Systems   Constitutional:  Negative for chills and fever.   HENT:  Negative for ear pain and sore  throat.    Eyes:  Negative for pain and visual disturbance.   Respiratory:  Negative for cough and shortness of breath.    Cardiovascular:  Negative for chest pain and palpitations.   Gastrointestinal:  Negative for abdominal pain and vomiting.   Genitourinary:  Positive for flank pain (R chest). Negative for dysuria and hematuria.   Musculoskeletal:  Negative for arthralgias and back pain.   Skin:  Negative for color change and rash.   Neurological:  Negative for seizures and syncope.   All other systems reviewed and are negative.      Physical Exam  Physical Exam  Vitals and nursing note reviewed.   Constitutional:       General: He is not in acute distress.     Appearance: Normal appearance. He is well-developed. He is obese.   HENT:      Head: Normocephalic and atraumatic.      Right Ear: External ear normal.      Left Ear: External ear normal.      Mouth/Throat:      Mouth: Mucous membranes are moist.   Eyes:      Conjunctiva/sclera: Conjunctivae normal.      Pupils: Pupils are equal, round, and reactive to light.   Cardiovascular:      Rate and Rhythm: Normal rate and regular rhythm.      Pulses: Normal pulses.      Heart sounds: Normal heart sounds. No murmur heard.  Pulmonary:      Effort: Pulmonary effort is normal. No respiratory distress.      Breath sounds: Normal breath sounds.   Abdominal:      General: Abdomen is flat.      Palpations: Abdomen is soft.      Tenderness: There is no abdominal tenderness.   Musculoskeletal:         General: No swelling.      Cervical back: Neck supple.   Skin:     General: Skin is warm and dry.      Capillary Refill: Capillary refill takes less than 2 seconds.   Neurological:      General: No focal deficit present.      Mental Status: He is alert and oriented to person, place, and time.   Psychiatric:         Mood and Affect: Mood normal.         Thought Content: Thought content normal.         Vital Signs  ED Triage Vitals [04/20/24 1654]   Temperature Pulse  Respirations Blood Pressure SpO2   97.5 °F (36.4 °C) 89 18 144/78 96 %      Temp Source Heart Rate Source Patient Position - Orthostatic VS BP Location FiO2 (%)   Temporal Monitor Sitting Left arm --      Pain Score       --           Vitals:    04/20/24 1654   BP: 144/78   Pulse: 89   Patient Position - Orthostatic VS: Sitting         Visual Acuity      ED Medications  Medications   ketorolac (TORADOL) injection 30 mg (30 mg Intramuscular Given 4/20/24 1738)       Diagnostic Studies  Results Reviewed       None                   XR chest 1 view portable   ED Interpretation by Inder Riley MD (04/20 1822)   Radiology studies have been independently visualized by me.  Preliminary interpretation: no ptx, no pleural effusion, normal heart size, no infiltrate or suspicious masses  All radiology studies will be officially read by the radiologist and any discrepancies flagged and follow through the discrepancy management process to make the patient aware of significant results.          Final Result by Mariajose Woodard MD (04/20 1950)      No acute cardiopulmonary disease.            Workstation performed: [id number]                    Procedures  Procedures         ED Course                               SBIRT 22yo+      Flowsheet Row Most Recent Value   Initial Alcohol Screen: US AUDIT-C     1. How often do you have a drink containing alcohol? 0 Filed at: 04/20/2024 1658   2. How many drinks containing alcohol do you have on a typical day you are drinking?  0 Filed at: 04/20/2024 1658   3a. Male UNDER 65: How often do you have five or more drinks on one occasion? 0 Filed at: 04/20/2024 1658   3b. FEMALE Any Age, or MALE 65+: How often do you have 4 or more drinks on one occassion? 0 Filed at: 04/20/2024 1658   Audit-C Score 0 Filed at: 04/20/2024 1658   BRICE: How many times in the past year have you...    Used an illegal drug or used a prescription medication for non-medical reasons? Never Filed at: 04/20/2024  1658                      Medical Decision Making  Ddx: rib fracture, costochondritis / PTX, effusion / pleurisy      Problems Addressed:  Chest wall pain: acute illness or injury  Chronic pain: chronic illness or injury with exacerbation, progression, or side effects of treatment    Amount and/or Complexity of Data Reviewed  Radiology: ordered and independent interpretation performed.  Discussion of management or test interpretation with external provider(s): Patient clinical presentation is benign.    Meaning patient's vital signs are normal and stable ED Triage Vitals [04/20/24 1654]  Temperature: 97.5 °F (36.4 °C)  Pulse: 89  Respirations: 18  Blood Pressure: 144/78  SpO2: 96 %  Temp Source: Temporal  Heart Rate Source: Monitor  Patient Position - Orthostatic VS: Sitting  BP Location: Left arm  FiO2 (%): n/a  Pain Score: n/a.    Patient in no distress.    Chief complaint, vital signs, physical examination does not suggest an acute medical emergency at this time.       Risk  Prescription drug management.             Disposition  Final diagnoses:   Chest wall pain   Chronic pain     Time reflects when diagnosis was documented in both MDM as applicable and the Disposition within this note       Time User Action Codes Description Comment    4/20/2024  6:09 PM Inder Riley Add [R07.89] Chest wall pain     4/20/2024  6:09 PM Inder Riley [G89.29] Chronic pain           ED Disposition       ED Disposition   Discharge    Condition   Stable    Date/Time   Sat Apr 20, 2024 1809    Comment   Lalito Cramer discharge to home/self care.                   Follow-up Information    None         Discharge Medication List as of 4/20/2024  6:10 PM        START taking these medications    Details   ketorolac (TORADOL) 10 mg tablet Take 1 tablet (10 mg total) by mouth every 6 (six) hours as needed for moderate pain, Starting Sat 4/20/2024, Normal           CONTINUE these medications which have NOT CHANGED    Details    buprenorphine (SUBUTEX) 2 mg Place 4 mg under the tongue 4 (four) times a day, Historical Med      DULoxetine (CYMBALTA) 60 mg delayed release capsule Take 60 mg by mouth daily, Historical Med      hydrOXYzine HCL (ATARAX) 25 mg tablet Take 1 tablet (25 mg total) by mouth every 6 (six) hours as needed for anxiety, Starting Wed 4/10/2024, No Print      levETIRAcetam (KEPPRA) 500 mg tablet Take 500 mg by mouth every 12 (twelve) hours, Historical Med      meloxicam (MOBIC) 7.5 mg tablet Take 1 tablet (7.5 mg total) by mouth daily, Starting Thu 4/11/2024, No Print      polyethylene glycol (MIRALAX) 17 g packet Take 17 g by mouth daily as needed (constipation), Starting Wed 4/10/2024, No Print      pregabalin (LYRICA) 25 mg capsule Take 100 mg by mouth 2 (two) times a day, Historical Med             No discharge procedures on file.    PDMP Review       None            ED Provider  Electronically Signed by             Inder Riley MD  04/20/24 2009

## (undated) DEVICE — COTTONOIDS 1/2X3

## (undated) DEVICE — TIP BOVIE NEEDLE COATED INSULATED

## (undated) DEVICE — NEEDLE DISP HYPO 25GX5/8IN

## (undated) DEVICE — COVER LIGHTHANDLE

## (undated) DEVICE — TUBING SMOKE EVAC PENCIL COATED

## (undated) DEVICE — GOWN SURGICAL REINFORCED LARGE

## (undated) DEVICE — Device

## (undated) DEVICE — TIP SUCTION FRAZIER 12FR

## (undated) DEVICE — GLOVE PROTEXIS LATEX MICRO 6

## (undated) DEVICE — PAD EYE OVAL STERILE

## (undated) DEVICE — ***USE 138605*** SUTURE SILK  4-0  783G

## (undated) DEVICE — SYRINGE DISP LUER-LOK  5 CC

## (undated) DEVICE — SOLN IRRIG .9%SOD 250ML